# Patient Record
Sex: MALE | Race: WHITE
[De-identification: names, ages, dates, MRNs, and addresses within clinical notes are randomized per-mention and may not be internally consistent; named-entity substitution may affect disease eponyms.]

---

## 2018-01-18 ENCOUNTER — HOSPITAL ENCOUNTER (OUTPATIENT)
Dept: HOSPITAL 92 - LABBT | Age: 78
Discharge: HOME | End: 2018-01-18
Attending: ORTHOPAEDIC SURGERY
Payer: MEDICARE

## 2018-01-18 DIAGNOSIS — M17.11: ICD-10-CM

## 2018-01-18 DIAGNOSIS — Z01.818: Primary | ICD-10-CM

## 2018-01-18 LAB
ANION GAP SERPL CALC-SCNC: 13 MMOL/L (ref 10–20)
APTT PPP: 31.5 SEC (ref 22.9–36.1)
BASOPHILS # BLD AUTO: 0.1 THOU/UL (ref 0–0.2)
BASOPHILS NFR BLD AUTO: 1.4 % (ref 0–1)
BUN SERPL-MCNC: 15 MG/DL (ref 8.4–25.7)
CALCIUM SERPL-MCNC: 9.8 MG/DL (ref 7.8–10.44)
CHLORIDE SERPL-SCNC: 104 MMOL/L (ref 98–107)
CO2 SERPL-SCNC: 28 MMOL/L (ref 23–31)
CREAT CL PREDICTED SERPL C-G-VRATE: 0 ML/MIN (ref 70–130)
CRYSTAL-AUWI FLAG: 0 (ref 0–15)
EOSINOPHIL # BLD AUTO: 0.6 THOU/UL (ref 0–0.7)
EOSINOPHIL NFR BLD AUTO: 9 % (ref 0–10)
GLUCOSE SERPL-MCNC: 94 MG/DL (ref 83–110)
HEV IGM SER QL: 1.3 (ref 0–7.99)
HGB BLD-MCNC: 15.5 G/DL (ref 14–18)
HYALINE CASTS #/AREA URNS LPF: (no result) LPF
INR PPP: 1
LYMPHOCYTES # BLD: 2.1 THOU/UL (ref 1.2–3.4)
LYMPHOCYTES NFR BLD AUTO: 30 % (ref 21–51)
MCH RBC QN AUTO: 34 PG (ref 27–31)
MCV RBC AUTO: 101 FL (ref 80–94)
MONOCYTES # BLD AUTO: 0.8 THOU/UL (ref 0.11–0.59)
MONOCYTES NFR BLD AUTO: 10.9 % (ref 0–10)
NEUTROPHILS # BLD AUTO: 3.4 THOU/UL (ref 1.4–6.5)
NEUTROPHILS NFR BLD AUTO: 48.8 % (ref 42–75)
PATHC CAST-AUWI FLAG: 0.27 (ref 0–2.49)
PLATELET # BLD AUTO: 374 THOU/UL (ref 130–400)
POTASSIUM SERPL-SCNC: 4.8 MMOL/L (ref 3.5–5.1)
PROTHROMBIN TIME: 13.3 SEC (ref 12–14.7)
RBC # BLD AUTO: 4.55 MILL/UL (ref 4.7–6.1)
RBC UR QL AUTO: (no result) HPF (ref 0–3)
SODIUM SERPL-SCNC: 140 MMOL/L (ref 136–145)
SP GR UR STRIP: 1.02 (ref 1–1.04)
SPERM-AUWI FLAG: 0 (ref 0–9.9)
WBC # BLD AUTO: 6.9 THOU/UL (ref 4.8–10.8)
YEAST-AUWI FLAG: 0 (ref 0–25)

## 2018-01-18 PROCEDURE — 93005 ELECTROCARDIOGRAM TRACING: CPT

## 2018-01-18 PROCEDURE — 86900 BLOOD TYPING SEROLOGIC ABO: CPT

## 2018-01-18 PROCEDURE — 81001 URINALYSIS AUTO W/SCOPE: CPT

## 2018-01-18 PROCEDURE — 86901 BLOOD TYPING SEROLOGIC RH(D): CPT

## 2018-01-18 PROCEDURE — 71046 X-RAY EXAM CHEST 2 VIEWS: CPT

## 2018-01-18 PROCEDURE — 86850 RBC ANTIBODY SCREEN: CPT

## 2018-01-18 PROCEDURE — 93010 ELECTROCARDIOGRAM REPORT: CPT

## 2018-01-18 PROCEDURE — 85730 THROMBOPLASTIN TIME PARTIAL: CPT

## 2018-01-18 PROCEDURE — 80048 BASIC METABOLIC PNL TOTAL CA: CPT

## 2018-01-18 PROCEDURE — 85610 PROTHROMBIN TIME: CPT

## 2018-01-18 PROCEDURE — 85025 COMPLETE CBC W/AUTO DIFF WBC: CPT

## 2018-01-18 NOTE — RAD
CHEST 2 VIEWS:

 

HISTORY: 

Preop.

 

COMPARISON: 

Chest 2 views 4/20/16.

 

FINDINGS: 

Incidental note is made of an azygous fissure.  Dense vascular calcifications of transverse aorta.

 

Multiple surgical clips in the mediastinum.  No focal airspace consolidation, pneumothorax, or effusi
on.  Mild pleural thickening in the left lower hemithorax.

 

No acute osseous abnormality.

 

IMPRESSION: 

Chronic changes.  No acute intrathoracic abnormality.

 

POS: [facility]

## 2018-01-23 ENCOUNTER — HOSPITAL ENCOUNTER (OUTPATIENT)
Dept: HOSPITAL 92 - SDC | Age: 78
LOS: 1 days | Discharge: HOME | End: 2018-01-24
Attending: ORTHOPAEDIC SURGERY
Payer: MEDICARE

## 2018-01-23 VITALS — BODY MASS INDEX: 24.1 KG/M2

## 2018-01-23 DIAGNOSIS — Z88.8: ICD-10-CM

## 2018-01-23 DIAGNOSIS — E78.5: ICD-10-CM

## 2018-01-23 DIAGNOSIS — Z85.118: ICD-10-CM

## 2018-01-23 DIAGNOSIS — Z90.2: ICD-10-CM

## 2018-01-23 DIAGNOSIS — Z98.890: ICD-10-CM

## 2018-01-23 DIAGNOSIS — Z85.841: ICD-10-CM

## 2018-01-23 DIAGNOSIS — Z79.899: ICD-10-CM

## 2018-01-23 DIAGNOSIS — Z88.0: ICD-10-CM

## 2018-01-23 DIAGNOSIS — K25.9: ICD-10-CM

## 2018-01-23 DIAGNOSIS — M17.0: Primary | ICD-10-CM

## 2018-01-23 DIAGNOSIS — M21.062: ICD-10-CM

## 2018-01-23 DIAGNOSIS — I10: ICD-10-CM

## 2018-01-23 PROCEDURE — 96361 HYDRATE IV INFUSION ADD-ON: CPT

## 2018-01-23 PROCEDURE — 97116 GAIT TRAINING THERAPY: CPT

## 2018-01-23 PROCEDURE — 36415 COLL VENOUS BLD VENIPUNCTURE: CPT

## 2018-01-23 PROCEDURE — G0378 HOSPITAL OBSERVATION PER HR: HCPCS

## 2018-01-23 PROCEDURE — 97139 UNLISTED THERAPEUTIC PX: CPT

## 2018-01-23 PROCEDURE — 27447 TOTAL KNEE ARTHROPLASTY: CPT

## 2018-01-23 PROCEDURE — 97530 THERAPEUTIC ACTIVITIES: CPT

## 2018-01-23 PROCEDURE — 0SRD0J9 REPLACEMENT OF LEFT KNEE JOINT WITH SYNTHETIC SUBSTITUTE, CEMENTED, OPEN APPROACH: ICD-10-PCS | Performed by: ORTHOPAEDIC SURGERY

## 2018-01-23 PROCEDURE — 8E0YXBZ COMPUTER ASSISTED PROCEDURE OF LOWER EXTREMITY: ICD-10-PCS | Performed by: ORTHOPAEDIC SURGERY

## 2018-01-23 PROCEDURE — C1713 ANCHOR/SCREW BN/BN,TIS/BN: HCPCS

## 2018-01-23 PROCEDURE — 97150 GROUP THERAPEUTIC PROCEDURES: CPT

## 2018-01-23 PROCEDURE — C1776 JOINT DEVICE (IMPLANTABLE): HCPCS

## 2018-01-23 PROCEDURE — 96374 THER/PROPH/DIAG INJ IV PUSH: CPT

## 2018-01-23 PROCEDURE — 85027 COMPLETE CBC AUTOMATED: CPT

## 2018-01-23 PROCEDURE — 20985 CPTR-ASST DIR MS PX: CPT

## 2018-01-23 PROCEDURE — A4306 DRUG DELIVERY SYSTEM <=50 ML: HCPCS

## 2018-01-23 PROCEDURE — 73560 X-RAY EXAM OF KNEE 1 OR 2: CPT

## 2018-01-23 RX ADMIN — HYDROCODONE BITARTRATE AND ACETAMINOPHEN PRN TAB: 10; 325 TABLET ORAL at 15:48

## 2018-01-23 RX ADMIN — HYDROCODONE BITARTRATE AND ACETAMINOPHEN PRN TAB: 10; 325 TABLET ORAL at 20:34

## 2018-01-23 NOTE — PDOC.PN
- Subjective


Encounter Start Date: 01/23/18


Encounter Start Time: 19:45


Subjective: Consult for med mgmt s/p L TKA. Hx of HTN, BPH, DJD and Lung Ca.


-: Reviewed all hx, labs, rads and EKG's. c/o L knee pain currently but


-: no CP, SOB, N/V.





- Objective


MAR Reviewed: Yes


Vital Signs & Weight: 


 Vital Signs (12 hours)











  Temp Pulse Resp BP Pulse Ox


 


 01/23/18 16:00    18  


 


 01/23/18 12:00  97.7 F  74  18  162/87 H  95








 Weight











Weight                         178 lb














Additional Labs: 





 Laboratory Tests











  01/18/18 01/18/18 01/18/18





  10:29 10:29 10:29


 


WBC   6.9 


 


Hgb   15.5 


 


Hct   45.9 


 


MCV   101.0 H 


 


Plt Count   374 


 


Neutrophils %   48.8 


 


PT    13.3


 


INR    1.0


 


APTT    31.5


 


Sodium  140  


 


Potassium  4.8  


 


Chloride  104  


 


Carbon Dioxide  28  


 


Anion Gap  13  


 


BUN  15  


 


Creatinine  0.87  


 


Estimated GFR (MDRD)  86  


 


Glucose  94  


 


Calcium  9.8  











EKG Reviewed by me: Yes (Tele - NSR in 70's)





Phys Exam





- Physical Examination


Constitutional: NAD


HEENT: PERRLA, oral pharynx no lesions


Neck: no JVD, supple


Respiratory: no wheezing, clear to auscultation bilateral


Cardiovascular: RRR


Gastrointestinal: soft, non-tender, no distention, positive bowel sounds


L knee with surgical dressing in place


Musculoskeletal: pulses present, edema present


Neurological: normal sensation, moves all 4 limbs


Psychiatric: A&O x 3


Skin: normal turgor, cap refill <2 seconds





Dx/Plan


(1) HTN (hypertension)


Code(s): I10 - ESSENTIAL (PRIMARY) HYPERTENSION   Status: Chronic   


Qualifiers: 


   Hypertension type: essential hypertension   Qualified Code(s): I10 - 

Essential (primary) hypertension   


Comment: Resume Lasix, Nifedipine and Lisinopril   





(2) HLD (hyperlipidemia)


Code(s): E78.5 - HYPERLIPIDEMIA, UNSPECIFIED   Status: Chronic   Comment: 

Continue Lipitor 10mg HS   





(3) Hx of cancer of lung


Code(s): Z85.118 - PERSONAL HISTORY OF MALIGNANT NEOPLASM OF BRONCHUS AND LUNG 

  Status: Chronic   





(4) Gastric stress ulcer


Code(s): K25.9 - GASTRIC ULCER, UNSP AS ACUTE OR CHRONIC, W/O HEMOR OR PERF   

Status: Resolved   Comment: Pepcid 20mg BID   





(5) Status post total knee replacement, left


Code(s): Z96.652 - PRESENCE OF LEFT ARTIFICIAL KNEE JOINT   Status: Acute   

Comment: s/p L TKA, pain control, DVT ppx, PT for mobilization   





- Plan


continue antibiotics, PT/OT, , incentive spirometry, out of bed/

ambulate, DVT proph w/SCDs


Stable currently


-: Continue home BP regimen and monitor clinically


-: DVT ppx


-: ASA 81mg BID


-: Continue Celebrex 200mg daily





* AM lab: CBC


* Thank you for the consult. Will continue to follow with primary team.

## 2018-01-23 NOTE — RAD
TWO VIEWS OF THE LEFT KNEE:

 

01/23/2018

 

HISTORY:

Status post knee arthroplasty.

 

FINDINGS:

Extensive atherosclerotic calcification is seen within the visualized left lower extremity posteriorl
y.  There is postoperative change involving the posterior aspect of the patella.  Tibial and fibular 
components are present, well seated, with no evidence for hardware failure, acute fracture, or disloc
ation.  Postoperative gas is noted anteriorly.

 

IMPRESSION:

Status post total knee arthroplasty on the left.

 

POS: [facility]

## 2018-01-23 NOTE — OP
DATE OF PROCEDURE:  01/23/2018

 

PREOPERATIVE DIAGNOSIS:  Left knee degenerative joint disease.

 

POSTOPERATIVE DIAGNOSIS:  Left knee degenerative joint disease.

 

SURGEON:  Julio C Zhou M.D.

 

ASSISTANT:  Vick Uriarte PA-C.

 

BLOOD LOSS:  Minimal.

 

SPECIMEN:  None.

 

DRAINS:  None.

 

COMPLICATIONS:  None.

 

FINDINGS AT SURGERY:  Valgus gonarthrosis.

 

IMPLANTS USED:  [place] Triathlon 4 femur, 5 tibia, 11 mm CSX3 polyethylene, and A29.

 

PROCEDURE IN DETAIL:  After informed consent was obtained in the preoperative holding area.  The peter
ent was taken to the operative suite where general anesthesia was induced.  Once adequate level of ge
neral anesthesia was obtained, the patient was positioned and a well-padded tourniquet was placed vj
und the left proximal thigh.  The left lower extremity was then prepped and draped in the usual steri
le fashion.  Prior to exsanguination, a time out was called and all members of the surgical team agre
ed upon site, surgeon, and patient.  The extremity was then exsanguinated and the tourniquet was rais
ed.  A midline longitudinal incision was then made directly over the patella extending two fingerbrea
dths above the superior pole of the patella and two fingerbreadths inferior to the inferior patellar 
pole of the patella.  Deeper subcutaneous layers were dissected sharply and local bleeding was contro
lled with Bovie electrocautery.  A quad tendon longitudinal split was then made sharply and a median 
parapatellar arthrotomy was carried out both sharp and with Bovie electrocautery, carried down to one
 fingerbreadth medial to the tibial tubercle.  The knee was then placed into flexion and the patella 
was everted nicely, and a copious fat pad ectomy was performed allowing for greater exposure of the t
ibia.  The computer-assisted distal femoral fiducial was then placed and pinned firmly, and the dista
l femoral cutting guide was pinned firmly into place.  The oscillating saw was then used to remove th
e appropriate amount of bone.  The 4-in-1 cutting block was then placed on the distal femur and the o
scillating saw was used to remove the appropriate amount of bone off of the anterior, posterior, and 
chamfer cuts.  After completion of bone cuts, the anterior cruciate ligament was resected sharply and
 the posterior cruciate ligament retractor was placed and the tibia was subluxed for better exposure.
  Partial meniscectomies were carried out, and the tibial computer-assisted fiducial was pinned, and 
the cutting guide was placed.  Oscillating saw was then used to remove the bone with Hohmann retracto
rs used to take care and protect the collateral ligaments.  After the tibial resection was performed,
 a laminar  was placed in between the freshened bone cuts.  The knee placed at 90 degrees and
 further bilateral meniscectomies were carried out, and the curved osteotome and curettage was used t
o remove any excess bone spurs in the posterior compartment.  Exparel was then injected into the post
erior capsule, pattie-articular synovia, pre-patella synovia, and musculature surrounding the capsule. 
 The trial femoral component, tibial baseplate were placed with the appropriate polyethylene trial in
sert with an appropriate polyethylene spacer and patellar button.  The knee was taken through full ra
nge of motion with flexion and extension from 0-90 degrees and patellar broach squarely in the trochl
ea without any squinting or subluxation noted.  The knee was also stable to varus and valgus stressin
g at 0, 15, 45, and 90 degrees of flexion.  The drawer was negative. All trial components were then r
emoved and the keel punch was used to provide the appropriate defect in the tibia with a mallet.  The
 freshened bone cuts were copiously irrigated with pulsatile lavage of about 1-1/2 liters to remove a
ll excess debris.  The freshened bone cuts were then dried and with suction and lap sponge.  The knee
 was placed in flexion and retractors were placed to provide access to all bone cuts.  Tobramycin imp
regnated methyl methacrylate cement was then placed on the freshened bone cuts and implants which wer
e malleted firmly into place.  Curettage and [place] elevators were used to remove any excess bone ceme
nt.  The knee was placed into full extension and the patellar button was placed under compression, an
d the cement was allowed to cure.  Once completed, the components were again taken through full range
 of motion and copious irrigation of the knee was carried out with another liter of normal saline.  A
ll components were inspected fully with full range of motion and varus and valgus stressing. There wa
s no laxity noted and full extension was observed clinically.  Primary closure was accomplished with 
#2 interrupted Vicryl stitch of the arthrotomy defect.  This was oversewn with a #2 running Quill bar
bed stitch.  The gravitational platelet system was then injected into the arthrotomy prior to closure
.  The subcutaneous layer was then closed with a running 0 barbed Monocryl stitch and skin closure ac
complished with a running subcuticular 3-0 Monocryl barbed Quill stitch and augmented with cement on 
the skin.  Tourniquet was lowered.  Good spontaneous return of distal pulses was noted clinically and
 a sterile dressing was applied to the incision.  The procedure was terminated without any complicati
ons.  The patient was awakened in the operative suite and the tourniquet was removed, and the patient
 was taken to the recovery room in stable condition.

## 2018-01-24 VITALS — SYSTOLIC BLOOD PRESSURE: 106 MMHG | TEMPERATURE: 97.9 F | DIASTOLIC BLOOD PRESSURE: 67 MMHG

## 2018-01-24 LAB
HGB BLD-MCNC: 12.6 G/DL (ref 14–18)
MCH RBC QN AUTO: 33.6 PG (ref 27–31)
MCV RBC AUTO: 102 FL (ref 80–94)
PLATELET # BLD AUTO: 274 THOU/UL (ref 130–400)
RBC # BLD AUTO: 3.76 MILL/UL (ref 4.7–6.1)
WBC # BLD AUTO: 9.1 THOU/UL (ref 4.8–10.8)

## 2018-01-24 RX ADMIN — HYDROCODONE BITARTRATE AND ACETAMINOPHEN PRN TAB: 10; 325 TABLET ORAL at 00:52

## 2018-01-24 RX ADMIN — HYDROCODONE BITARTRATE AND ACETAMINOPHEN PRN TAB: 10; 325 TABLET ORAL at 09:41

## 2018-01-24 RX ADMIN — HYDROCODONE BITARTRATE AND ACETAMINOPHEN PRN TAB: 10; 325 TABLET ORAL at 05:26

## 2018-01-24 RX ADMIN — HYDROCODONE BITARTRATE AND ACETAMINOPHEN PRN TAB: 10; 325 TABLET ORAL at 13:13

## 2018-03-01 ENCOUNTER — HOSPITAL ENCOUNTER (OUTPATIENT)
Dept: HOSPITAL 92 - MRI | Age: 78
Discharge: HOME | End: 2018-03-01
Attending: ORTHOPAEDIC SURGERY
Payer: MEDICARE

## 2018-03-01 DIAGNOSIS — S76.112A: Primary | ICD-10-CM

## 2018-03-01 DIAGNOSIS — Z96.652: ICD-10-CM

## 2018-03-02 ENCOUNTER — HOSPITAL ENCOUNTER (OUTPATIENT)
Dept: HOSPITAL 92 - SDC | Age: 78
Discharge: HOME | End: 2018-03-02
Attending: ORTHOPAEDIC SURGERY
Payer: MEDICARE

## 2018-03-02 VITALS — BODY MASS INDEX: 24.8 KG/M2

## 2018-03-02 DIAGNOSIS — S76.112A: Primary | ICD-10-CM

## 2018-03-02 DIAGNOSIS — Z88.6: ICD-10-CM

## 2018-03-02 DIAGNOSIS — Z88.0: ICD-10-CM

## 2018-03-02 PROCEDURE — 96374 THER/PROPH/DIAG INJ IV PUSH: CPT

## 2018-03-02 PROCEDURE — 0LQM0ZZ REPAIR LEFT UPPER LEG TENDON, OPEN APPROACH: ICD-10-PCS | Performed by: ORTHOPAEDIC SURGERY

## 2018-03-02 PROCEDURE — 27385 REPAIR OF THIGH MUSCLE: CPT

## 2018-03-02 PROCEDURE — 97116 GAIT TRAINING THERAPY: CPT

## 2018-03-02 PROCEDURE — 97139 UNLISTED THERAPEUTIC PX: CPT

## 2018-03-02 NOTE — OP
DATE OF PROCEDURE:  03/02/2018

 

TYPE OF PROCEDURE:  Left open quadriceps tendon repair.  Manipulation under anesthesia.

 

PREOPERATIVE DIAGNOSIS:  Quadriceps tendon rupture.

 

POSTOPERATIVE DIAGNOSIS:  Quadriceps tendon rupture.

 

SURGEON:  Julio C Zhou M.D.

 

ASSISTANT:  Vick Uriarte PA-C.

 

BLOOD LOSS:  Minimal.

 

SPECIMENS:  None.

 

DRAINS:  None.

 

COMPLICATIONS:  None.

 

DESCRIPTION OF PROCEDURE:  Left leg was prepped and draped in the usual sterile fashion.  The patient
 received vancomycin and Levaquin preoperatively.  I opened up the old incision.  Large hematoma was 
encountered and dissected free the quadriceps tendon proximally and the patella distally.  He had rup
tured his quadriceps tendon transversely and ruptured extended down the lateral aspect of the patella
.  The medial aspect of the patella repair had held in just fine and the quadriceps tendon repair hel
d just fine.  This was a true transverse rupture.  This is a somewhat atypical rupture pattern in a p
ost total knee.  Irrigation was performed pulsatile lavage.  I repaired the tendon with #5 Vicryl in 
multiple layers.  I then reinforced this with 2-0 Vicryl for some of the thinner aspects.  I was able
 to flex the knee 90 degrees and the tendon repair held.  The patient was placed in extension.  Subcu
taneous tissue closed with 0 Quill and skin was closed with 2-0 Prolene.  Sterile dressings applied. 
 Tourniquet was released.  The patient be placed in postoperative straight leg knee immobilizer for 2
 months.

## 2018-09-26 NOTE — EKG
Test Reason : 

Blood Pressure : ***/*** mmHG

Vent. Rate : 075 BPM     Atrial Rate : 075 BPM

   P-R Int : 154 ms          QRS Dur : 094 ms

    QT Int : 406 ms       P-R-T Axes : -26 061 043 degrees

   QTc Int : 453 ms

 

Normal sinus rhythm

Normal ECG

When compared with ECG of 14-DEC-2010 06:56,

Premature ventricular complexes are no longer Present

Confirmed by KELSEY HENNESSY, DR. KELLER ([id number]) on 1/18/2018 7:55:51 PM

 

Referred By:  RADHA           Confirmed By:DR. KRISHNA COLE MD Statement Selected

## 2018-10-04 ENCOUNTER — HOSPITAL ENCOUNTER (OUTPATIENT)
Dept: HOSPITAL 92 - LABBT | Age: 78
Discharge: HOME | End: 2018-10-04
Attending: ORTHOPAEDIC SURGERY
Payer: MEDICARE

## 2018-10-04 ENCOUNTER — HOSPITAL ENCOUNTER (INPATIENT)
Dept: HOSPITAL 92 - SJJU | Age: 78
LOS: 15 days | Discharge: HOME | DRG: 470 | End: 2018-10-19
Attending: ORTHOPAEDIC SURGERY | Admitting: ORTHOPAEDIC SURGERY
Payer: MEDICARE

## 2018-10-04 VITALS — BODY MASS INDEX: 26.4 KG/M2

## 2018-10-04 DIAGNOSIS — G40.909: ICD-10-CM

## 2018-10-04 DIAGNOSIS — Z88.0: ICD-10-CM

## 2018-10-04 DIAGNOSIS — J44.9: ICD-10-CM

## 2018-10-04 DIAGNOSIS — N40.0: ICD-10-CM

## 2018-10-04 DIAGNOSIS — Z01.818: Primary | ICD-10-CM

## 2018-10-04 DIAGNOSIS — Z79.899: ICD-10-CM

## 2018-10-04 DIAGNOSIS — M17.11: Primary | ICD-10-CM

## 2018-10-04 DIAGNOSIS — Z96.652: ICD-10-CM

## 2018-10-04 DIAGNOSIS — Z88.6: ICD-10-CM

## 2018-10-04 DIAGNOSIS — N18.2: ICD-10-CM

## 2018-10-04 DIAGNOSIS — F41.9: ICD-10-CM

## 2018-10-04 DIAGNOSIS — E78.5: ICD-10-CM

## 2018-10-04 DIAGNOSIS — Z86.011: ICD-10-CM

## 2018-10-04 DIAGNOSIS — M17.11: ICD-10-CM

## 2018-10-04 DIAGNOSIS — F51.04: ICD-10-CM

## 2018-10-04 DIAGNOSIS — F32.9: ICD-10-CM

## 2018-10-04 DIAGNOSIS — Z85.118: ICD-10-CM

## 2018-10-04 DIAGNOSIS — I12.9: ICD-10-CM

## 2018-10-04 LAB
CRYSTAL-AUWI FLAG: 0 (ref 0–15)
HEV IGM SER QL: 1.3 (ref 0–7.99)
HYALINE CASTS #/AREA URNS LPF: (no result) LPF
PATHC CAST-AUWI FLAG: 0 (ref 0–2.49)
RBC UR QL AUTO: (no result) HPF (ref 0–3)
SP GR UR STRIP: 1.02 (ref 1–1.04)
SPERM-AUWI FLAG: 0 (ref 0–9.9)
YEAST-AUWI FLAG: 0 (ref 0–25)

## 2018-10-04 PROCEDURE — 85027 COMPLETE CBC AUTOMATED: CPT

## 2018-10-04 PROCEDURE — 87081 CULTURE SCREEN ONLY: CPT

## 2018-10-04 PROCEDURE — 81001 URINALYSIS AUTO W/SCOPE: CPT

## 2018-10-04 PROCEDURE — 93005 ELECTROCARDIOGRAM TRACING: CPT

## 2018-10-04 PROCEDURE — 36415 COLL VENOUS BLD VENIPUNCTURE: CPT

## 2018-10-04 PROCEDURE — 80048 BASIC METABOLIC PNL TOTAL CA: CPT

## 2018-10-04 PROCEDURE — C1713 ANCHOR/SCREW BN/BN,TIS/BN: HCPCS

## 2018-10-04 PROCEDURE — 71046 X-RAY EXAM CHEST 2 VIEWS: CPT

## 2018-10-04 PROCEDURE — 83735 ASSAY OF MAGNESIUM: CPT

## 2018-10-04 PROCEDURE — C1776 JOINT DEVICE (IMPLANTABLE): HCPCS

## 2018-10-04 PROCEDURE — 93010 ELECTROCARDIOGRAM REPORT: CPT

## 2018-10-04 NOTE — RAD
PA AND LATERAL VIEWS OF THE CHEST:

 

HISTORY: 

Preoperative evaluation.

 

FINDINGS: 

Comparison is made with the exam of 1/18/2018.

 

The azygous fissure is again seen.  The heart size is normal.  The aorta is tortuous.  The lungs are 
expanded without lobar consolidation, pneumothoraces, or pleural effusions.  Old left-sided rib fract
ures are redemonstrated.  Mild pleural thickening in the left lower hemithorax is again seen.  No acu
te osseous abnormalities identified.

 

IMPRESSION: 

Stable exam.  No acute process.

 

POS: [facility]

## 2018-10-07 NOTE — EKG
Test Reason : 

Blood Pressure : ***/*** mmHG

Vent. Rate : 065 BPM     Atrial Rate : 065 BPM

   P-R Int : 154 ms          QRS Dur : 094 ms

    QT Int : 458 ms       P-R-T Axes : -09 050 045 degrees

   QTc Int : 476 ms

 

*** Poor data quality, interpretation may be adversely affected

Normal sinus rhythm with sinus arrhythmia

Normal ECG

When compared with ECG of 18-JAN-2018 10:21,

No significant change was found

Confirmed by RAE DANGELO ([id number]) on 10/7/2018 8:47:06 PM

 

Referred By:  RADHA           Confirmed By:RAE DANGELO

## 2018-10-10 ENCOUNTER — HOSPITAL ENCOUNTER (OUTPATIENT)
Dept: HOSPITAL 92 - LABBT | Age: 78
Discharge: HOME | End: 2018-10-10
Attending: ORTHOPAEDIC SURGERY
Payer: MEDICARE

## 2018-10-10 DIAGNOSIS — M17.11: ICD-10-CM

## 2018-10-10 DIAGNOSIS — Z01.818: Primary | ICD-10-CM

## 2018-10-10 LAB
ANION GAP SERPL CALC-SCNC: 12 MMOL/L (ref 10–20)
BASOPHILS # BLD AUTO: 0.1 THOU/UL (ref 0–0.2)
BASOPHILS NFR BLD AUTO: 1.1 % (ref 0–1)
BUN SERPL-MCNC: 14 MG/DL (ref 8.4–25.7)
CALCIUM SERPL-MCNC: 9.4 MG/DL (ref 7.8–10.44)
CHLORIDE SERPL-SCNC: 106 MMOL/L (ref 98–107)
CO2 SERPL-SCNC: 25 MMOL/L (ref 23–31)
CREAT CL PREDICTED SERPL C-G-VRATE: 0 ML/MIN (ref 70–130)
EOSINOPHIL # BLD AUTO: 0.6 THOU/UL (ref 0–0.7)
EOSINOPHIL NFR BLD AUTO: 10.1 % (ref 0–10)
GLUCOSE SERPL-MCNC: 95 MG/DL (ref 83–110)
HGB BLD-MCNC: 14.7 G/DL (ref 14–18)
INR PPP: 1
LYMPHOCYTES # BLD: 2.1 THOU/UL (ref 1.2–3.4)
LYMPHOCYTES NFR BLD AUTO: 34.2 % (ref 21–51)
MCH RBC QN AUTO: 34 PG (ref 27–31)
MCV RBC AUTO: 101 FL (ref 78–98)
MONOCYTES # BLD AUTO: 0.7 THOU/UL (ref 0.11–0.59)
MONOCYTES NFR BLD AUTO: 11.2 % (ref 0–10)
NEUTROPHILS # BLD AUTO: 2.6 THOU/UL (ref 1.4–6.5)
NEUTROPHILS NFR BLD AUTO: 43.4 % (ref 42–75)
PLATELET # BLD AUTO: 282 THOU/UL (ref 130–400)
POTASSIUM SERPL-SCNC: 3.8 MMOL/L (ref 3.5–5.1)
PROTHROMBIN TIME: 13.1 SEC (ref 12–14.7)
RBC # BLD AUTO: 4.32 MILL/UL (ref 4.7–6.1)
SODIUM SERPL-SCNC: 139 MMOL/L (ref 136–145)
WBC # BLD AUTO: 6 THOU/UL (ref 4.8–10.8)

## 2018-10-10 PROCEDURE — 85610 PROTHROMBIN TIME: CPT

## 2018-10-10 PROCEDURE — 86850 RBC ANTIBODY SCREEN: CPT

## 2018-10-10 PROCEDURE — 85025 COMPLETE CBC W/AUTO DIFF WBC: CPT

## 2018-10-10 PROCEDURE — 86900 BLOOD TYPING SEROLOGIC ABO: CPT

## 2018-10-10 PROCEDURE — 86901 BLOOD TYPING SEROLOGIC RH(D): CPT

## 2018-10-10 PROCEDURE — 80048 BASIC METABOLIC PNL TOTAL CA: CPT

## 2018-10-16 PROCEDURE — 0SRC0J9 REPLACEMENT OF RIGHT KNEE JOINT WITH SYNTHETIC SUBSTITUTE, CEMENTED, OPEN APPROACH: ICD-10-PCS | Performed by: ORTHOPAEDIC SURGERY

## 2018-10-16 RX ADMIN — HYDROCODONE BITARTRATE AND ACETAMINOPHEN PRN TAB: 10; 325 TABLET ORAL at 21:03

## 2018-10-16 RX ADMIN — NIFEDIPINE SCH MG: 30 TABLET, FILM COATED, EXTENDED RELEASE ORAL at 21:04

## 2018-10-16 RX ADMIN — ASPIRIN SCH MG: 81 TABLET ORAL at 21:05

## 2018-10-16 NOTE — PDOC.PN
- Subjective


Encounter Start Date: 10/16/18


Encounter Start Time: 11:45





Patient seen and examined for med mngt. No CP/SOB/N/V/Abd pain. Follows Dr Ortega. Echo - 12/17 - normal EF. No new complaints. Pain controlled.





- Objective


MAR Reviewed: Yes


Vital Signs & Weight: 


 Vital Signs (12 hours)











  Temp Pulse Resp BP Pulse Ox


 


 10/16/18 09:30  97.9 F  83  18  108/66  94 L








 Weight











Weight                         195 lb














Additional Labs: 





 Laboratory Tests











  10/10/18 10/10/18





  11:05 11:20


 


Hgb  14.7 


 


Hct  43.5 


 


Sodium   139


 


Potassium   3.8











EKG Reviewed by me: Yes (SR)





Phys Exam





- Physical Examination


Constitutional: NAD


Respiratory: no wheezing, no rales, no rhonchi


Cardiovascular: RRR, no rub


Gastrointestinal: soft, non-tender, positive bowel sounds


Musculoskeletal: no edema


Neurological: moves all 4 limbs





Dx/Plan





- Plan


DVT proph w/SCDs





IMPRESSION/PLAN:


1. COPD 


   Will cont inhaled steroids with PRN nebs





2. HTN


   Cont Lasix/Lisinopril at home dose


   Change Procardia XL to 30 mg BID





3. Seizure disorder


   Cont Keppra





4. h/o Lung Ca s/p resection





5. Anxiety/Depression


   Cont Zoloft, No suicidal ideation





6.HLD


   Cont Zocor





7. Chronic insomnia


   Cont Ambien PRN





8. BPH


   Cont Flomax





Thank you for this consultation. Will follow.


Full code. DPOA - self/family








Review of Systems





- Review of Systems


Respiratory: negative: Cough, Dry, Shortness of Breath, Hemoptysis, SOB with 

Excertion, Pleuritic Pain, Sputum, Wheezing


Cardiovascular: negative: chest pain, palpitations, orthopnea, paroxysmal 

nocturnal dyspnea, edema, light headedness, other


Gastrointestinal: negative: Nausea, Vomiting, Abdominal Pain, Diarrhea, 

Constipation, Melena, Hematochezia, Other





- Medications/Allergies


Allergies/Adverse Reactions: 


 Allergies











Allergy/AdvReac Type Severity Reaction Status Date / Time


 


Penicillins Allergy  Rash Verified 10/04/18 09:13


 


ibuprofen AdvReac  BLEEDING Verified 10/04/18 09:13





   ULCERS  











Medications: 


 Current Medications





Acetaminophen (Tylenol)  650 mg PO Q4H PRN


   PRN Reason: HA/ T > 101F; Mild Pain (1-3)


Hydrocodone Bitart/Acetaminophen (Norco 10/325)  1 tab PO Q4H PRN


   PRN Reason: Pain (1-3)


Hydrocodone Bitart/Acetaminophen (Norco 10/325)  2 tab PO Q4H PRN


   PRN Reason: PAIN (4-6)


Aspirin (Ecotrin)  81 mg PO BID [facility]


Atorvastatin Calcium (Lipitor)  10 mg PO HS [facility]


Celecoxib (Celebrex)  200 mg PO QAM [facility]


Fentanyl (Sublimaze)  50 mcg IV Q1H PRN


   PRN Reason: BREAKTHRU PAIN


Ferrous Gluconate (Fergon)  324 mg PO BID [facility]


Furosemide (Lasix)  20 mg PO .3 X WEEK [facility]


Vancomycin HCl 1.5 gm/ Sodium (Chloride)  300 mls @ 200 mls/hr IVPB ONCALL-OR 

[facility]


   Stop: 10/16/18 12:00


Levofloxacin 500 mg/ Device  100 mls @ 100 mls/hr IVPB 0600 [facility]


   Stop: 10/17/18 06:59


Sodium Chloride (Normal Saline 0.9%)  1,000 mls @ 100 mls/hr IV .Q10H [facility]


Vancomycin HCl 1.5 gm/ Sodium (Chloride)  300 mls @ 200 mls/hr IVPB 1800 [facility]


   Stop: 10/16/18 19:29


Ropivacaine 250 ml/ Device  250 mls @ 10 mls/hr NERVE BLCK INF [facility]


Iron/Minerals/Multivitamins (Theragran M)  1 tab PO DAILY [facility]


Ketorolac Tromethamine (Toradol)  15 mg IVP Q6H PRN


   PRN Reason: Moderate Pain (4-6)


   Stop: 10/19/18 07:30


Levetiracetam (Keppra)  500 mg PO BID [facility]


Lisinopril (Zestril)  20 mg PO QAM [facility]


Lorazepam (Ativan)  1 mg PO Q4H PRN


   PRN Reason: Anxiety/Agitation


Nifedipine (Procardia Xl)  60 mg PO HS [facility]


Ondansetron HCl (Zofran)  4 mg IVP Q6H PRN


   PRN Reason: Nausea/Vomiting


Potassium Chloride (K-Dur)  20 meq PO .3 X WEEK [facility]


Promethazine HCl (Phenergan)  12.5 mg IM Q4H PRN


   PRN Reason: Nausea


Senna/Docusate Sodium (Senokot S)  2 tab PO BID [facility]


Sertraline HCl (Zoloft)  100 mg PO QAM [facility]


Sodium Chloride (Flush - Normal Saline)  10 ml IVF PRN PRN


   PRN Reason: Saline Flush


Tamsulosin HCl (Flomax)  0.4 mg PO HS [facility]


Tramadol HCl (Ultram)  50 mg PO Q6H PRN


   PRN Reason: Mild Pain (1-3)


Tramadol HCl (Ultram)  100 mg PO Q6H PRN


   PRN Reason: Moderate Pain 4-6


Zolpidem Tartrate (Ambien)  5 mg PO HSPRN PRN


   PRN Reason: Insomnia

## 2018-10-16 NOTE — OP
DATE OF PROCEDURE:  10/16/2018

 

PREOPERATIVE DIAGNOSIS:  Degenerative joint disease, right knee.

 

POSTOPERATIVE DIAGNOSIS:  Degenerative joint disease, right knee.

 

SURGEON:  Julio C Zhou M.D.

 

ASSISTANT:  Vick Uriarte PA-C.

 

BLOOD LOSS:  Minimal.

 

SPECIMEN:  None.

 

DRAINS:  None.

 

COMPLICATIONS:  None.

 

IMPLANTS USED:  [place] Triathlon 4 femur, 5 tibia, 11 mm 6/3 open polyethylene liner by 11 thick and
 A29 patella.

PROCEDURE IN DETAIL:  After informed consent was obtained in the preoperative holding area.  The peter
ent was taken to the operative suite where general anesthesia was induced.  Once adequate level of ge
neral anesthesia was obtained, the patient was positioned and a well-padded tourniquet was placed vj
und the right proximal thigh.  The right lower extremity was then prepped and draped in the usual deborah
rile fashion.  Prior to exsanguination, a time out was called and all members of the surgical team ag
justin upon site, surgeon, and patient.  The extremity was then exsanguinated and the tourniquet was ra
ised.  A midline longitudinal incision was then made directly over the patella extending two fingerbr
eadths above the superior pole of the patella and two fingerbreadths inferior to the inferior patella
r pole of the patella.  Deeper subcutaneous layers were dissected sharply and local bleeding was cont
rolled with Bovie electrocautery.  A quad tendon longitudinal split was then made sharply and a media
n parapatellar arthrotomy was carried out both sharp and with Bovie electrocautery, carried down to o
ne fingerbreadth medial to the tibial tubercle.  The knee was then placed into flexion and the patell
a was everted nicely, and a copious fat pad ectomy was performed allowing for greater exposure of the
 tibia.  The computer-assisted distal femoral fiducial was then placed and pinned firmly, and the dis
[place] femoral cutting guide was pinned firmly into place.  The oscillating saw was then used to remove 
the appropriate amount of bone.  The 4-in-1 cutting block was then placed on the distal femur and the
 oscillating saw was used to remove the appropriate amount of bone off of the anterior, posterior, an
d chamfer cuts.  After completion of bone cuts, the anterior cruciate ligament was resected sharply a
nd the posterior cruciate ligament retractor was placed and the tibia was subluxed for better exposur
e.  Partial meniscectomies were carried out, and the tibial computer-assisted fiducial was pinned, an
d the cutting guide was placed.  Oscillating saw was then used to remove the bone with Hohmann retrac
tors used to take care and protect the collateral ligaments.  After the tibial resection was performe
d, a laminar  was placed in between the freshened bone cuts.  The knee placed at 90 degrees a
nd further bilateral meniscectomies were carried out, and the curved osteotome and curettage was used
 to remove any excess bone spurs in the posterior compartment.  The trial femoral component, tibial b
aseplate were placed with the appropriate polyethylene trial insert with an appropriate polyethylene 
spacer and patellar button.  The knee was taken through full range of motion with flexion and extensi
on from 0-90 degrees and patellar broach squarely in the trochlea without any squinting or subluxatio
n noted.  The knee was also stable to varus and valgus stressing at 0, 15, 45, and 90 degrees of flex
ion.  The drawer was negative. All trial components were then removed and the keel punch was used to 
provide the appropriate defect in the tibia with a mallet.  The freshened bone cuts were copiously ir
rigated with pulsatile lavage of about 1-1/2 liters to remove all excess debris.  The freshened bone 
cuts were then dried and with suction and lap sponge.  The knee was placed in flexion and retractors 
were placed to provide access to all bone cuts.  Tobramycin impregnated methyl methacrylate cement wa
s then placed on the freshened bone cuts and implants which were malleted firmly into place.  Curetta
ge and [place] elevators were used to remove any excess bone cement.  The knee was placed into full ext
ension and the patellar button was placed under compression, and the cement was allowed to cure.  Onc
e completed, the components were again taken through full range of motion and copious irrigation of t
he knee was carried out with another liter of normal saline.  All components were inspected fully wit
h full range of motion and varus and valgus stressing. There was no laxity noted and full extension w
as observed clinically.  Primary closure was accomplished with #2 interrupted Vicryl stitch of the ar
throtomy defect.  This was oversewn with a #2 running Quill barbed stitch.  The gravitational platele
t system was then injected into the arthrotomy prior to closure.  The subcutaneous layer was then ania
sed with a running 0 barbed Monocryl stitch and skin closure accomplished with a running subcuticular
 3-0 Monocryl barbed Quill stitch and augmented with cement on the skin.  Tourniquet was lowered.  Go
od spontaneous return of distal pulses was noted clinically and a sterile dressing was applied to the
 incision.  The procedure was terminated without any complications.  The patient was awakened in the 
operative suite and the ?? was removed, and the patient was taken to the recovery room in stable cond
ition.

## 2018-10-16 NOTE — RAD
RIGHT KNEE TWO VIEWS:

 

History: 

77-year-old male with history of post op total knee arthroplasty. 

 

FINDINGS: 

Recent total knee arthroplasty changes are noted. No dislocation or periprosthetic fracture. Prominen
t vascular calcifications. 

 

IMPRESSION: 

Recent total knee arthroplasty. 

 

POS: [facility]

## 2018-10-17 LAB
ANION GAP SERPL CALC-SCNC: 10 MMOL/L (ref 10–20)
BUN SERPL-MCNC: 12 MG/DL (ref 8.4–25.7)
CALCIUM SERPL-MCNC: 8.7 MG/DL (ref 7.8–10.44)
CHLORIDE SERPL-SCNC: 108 MMOL/L (ref 98–107)
CO2 SERPL-SCNC: 24 MMOL/L (ref 23–31)
CREAT CL PREDICTED SERPL C-G-VRATE: 95 ML/MIN (ref 70–130)
GLUCOSE SERPL-MCNC: 94 MG/DL (ref 83–110)
HGB BLD-MCNC: 12.5 G/DL (ref 14–18)
MAGNESIUM SERPL-MCNC: 2.1 MG/DL (ref 1.6–2.6)
MCH RBC QN AUTO: 33.4 PG (ref 27–31)
MCV RBC AUTO: 101 FL (ref 78–98)
PLATELET # BLD AUTO: 234 THOU/UL (ref 130–400)
POTASSIUM SERPL-SCNC: 3.6 MMOL/L (ref 3.5–5.1)
RBC # BLD AUTO: 3.74 MILL/UL (ref 4.7–6.1)
SODIUM SERPL-SCNC: 138 MMOL/L (ref 136–145)
WBC # BLD AUTO: 7.2 THOU/UL (ref 4.8–10.8)

## 2018-10-17 RX ADMIN — DOCUSATE SODIUM 50 MG AND SENNOSIDES 8.6 MG SCH TAB: 8.6; 5 TABLET, FILM COATED ORAL at 08:11

## 2018-10-17 RX ADMIN — NIFEDIPINE SCH MG: 30 TABLET, FILM COATED, EXTENDED RELEASE ORAL at 21:03

## 2018-10-17 RX ADMIN — DOCUSATE SODIUM 50 MG AND SENNOSIDES 8.6 MG SCH: 8.6; 5 TABLET, FILM COATED ORAL at 21:05

## 2018-10-17 RX ADMIN — ASPIRIN SCH MG: 81 TABLET ORAL at 08:10

## 2018-10-17 RX ADMIN — NIFEDIPINE SCH MG: 30 TABLET, FILM COATED, EXTENDED RELEASE ORAL at 08:12

## 2018-10-17 RX ADMIN — ASPIRIN SCH MG: 81 TABLET ORAL at 21:03

## 2018-10-17 RX ADMIN — MULTIPLE VITAMINS W/ MINERALS TAB SCH TAB: TAB at 08:10

## 2018-10-17 RX ADMIN — HYDROCODONE BITARTRATE AND ACETAMINOPHEN PRN TAB: 10; 325 TABLET ORAL at 21:01

## 2018-10-17 NOTE — PDOC.PN
- Subjective


Encounter Start Date: 10/17/18


Encounter Start Time: 14:00





Patient seen and examined for med mngt. Had nausea this AM - now improved. No 

other complaints. No overnight events





- Objective


MAR Reviewed: Yes


Vital Signs & Weight: 


 Vital Signs (12 hours)











  Temp Pulse Resp BP BP BP Pulse Ox


 


 10/17/18 21:03   78   160/69 H   


 


 10/17/18 20:00  98.5 F  78  16   135/71   95


 


 10/17/18 15:21  98.7 F  80  16    136/73  93 L


 


 10/17/18 12:05      106/67  


 


 10/17/18 12:00  98.1 F  81  18    96/61  93 L








 Weight











Admit Weight                   195 lb


 


Weight                         195 lb














I&O: 


 











 10/16/18 10/17/18 10/18/18





 06:59 06:59 06:59


 


Intake Total  2740 1140


 


Output Total  1625 


 


Balance  1115 1140











Result Diagrams: 


 10/17/18 04:40





 10/17/18 04:40





Phys Exam





- Physical Examination


Constitutional: NAD


Respiratory: no wheezing, no rhonchi


Cardiovascular: RRR, no rub


Gastrointestinal: soft, non-tender, positive bowel sounds


Musculoskeletal: no edema


Neurological: moves all 4 limbs





Dx/Plan





- Plan


DVT proph w/SCDs





IMPRESSION:


1. COPD 


2. HTN


3. Seizure disorder


4. h/o Lung Ca s/p resection


5. Anxiety/Depression


6.HLD


7. Chronic insomnia


8. BPH


9. CKD 2





PLAN:


Will cont inhaled steroids with PRN nebs


Cont Lasix/Lisinopril/Procardia XL


Cont other meds as below








Review of Systems





- Review of Systems


Respiratory: negative: Cough, Dry, Shortness of Breath, Hemoptysis, SOB with 

Excertion, Pleuritic Pain, Sputum, Wheezing


Cardiovascular: negative: chest pain, palpitations, orthopnea, paroxysmal 

nocturnal dyspnea, edema, light headedness, other


Gastrointestinal: Nausea.  negative: Vomiting, Abdominal Pain, Diarrhea, 

Constipation, Melena, Hematochezia, Other





- Medications/Allergies


Allergies/Adverse Reactions: 


 Allergies











Allergy/AdvReac Type Severity Reaction Status Date / Time


 


Penicillins Allergy  Rash Verified 10/04/18 09:13


 


ibuprofen AdvReac  BLEEDING Verified 10/04/18 09:13





   ULCERS  











Medications: 


 Current Medications





Acetaminophen (Tylenol)  650 mg PO Q4H PRN


   PRN Reason: HA/ T > 101F; Mild Pain (1-3)


Hydrocodone Bitart/Acetaminophen (Norco 10/325)  1 tab PO Q4H PRN


   PRN Reason: Pain (1-3)


   Last Admin: 10/17/18 04:33 Dose:  1 tab


Hydrocodone Bitart/Acetaminophen (Norco 10/325)  2 tab PO Q4H PRN


   PRN Reason: PAIN (4-6)


   Last Admin: 10/17/18 21:01 Dose:  2 tab


Albuterol/Ipratropium (Duoneb)  3 ml NEB [id number] PRN


   PRN Reason: SOB &/or Wheezing


Aspirin (Ecotrin)  81 mg PO BID [facility]


   Last Admin: 10/17/18 21:03 Dose:  81 mg


Atorvastatin Calcium (Lipitor)  10 mg PO HS [facility]


   Last Admin: 10/17/18 21:02 Dose:  10 mg


Celecoxib (Celebrex)  200 mg PO QAM [facility]


   Last Admin: 10/17/18 08:10 Dose:  200 mg


Fentanyl (Sublimaze)  50 mcg IV Q1H PRN


   PRN Reason: BREAKTHRU PAIN


Ferrous Gluconate (Fergon)  324 mg PO BID [facility]


   Last Admin: 10/17/18 21:03 Dose:  324 mg


Furosemide (Lasix)  20 mg PO .3 X WEEK [facility]


Sodium Chloride (Normal Saline 0.9%)  1,000 mls @ 100 mls/hr IV .Q10H [facility]


   Last Admin: 10/17/18 21:54 Dose:  Not Given


Ropivacaine 250 ml/ Device  250 mls @ 10 mls/hr NERVE BLCK INF [facility]


   Last Admin: 10/17/18 08:46 Dose:  250 mls


Iron/Minerals/Multivitamins (Theragran M)  1 tab PO DAILY [facility]


   Last Admin: 10/17/18 08:10 Dose:  1 tab


Ketorolac Tromethamine (Toradol)  15 mg IVP Q6H PRN


   PRN Reason: Moderate Pain (4-6)


   Stop: 10/19/18 07:30


   Last Admin: 10/17/18 06:46 Dose:  15 mg


Levetiracetam (Keppra)  500 mg PO BID [facility]


   Last Admin: 10/17/18 21:02 Dose:  500 mg


Lisinopril (Zestril)  20 mg PO QAM [facility]


   Last Admin: 10/17/18 08:11 Dose:  20 mg


Lorazepam (Ativan)  1 mg PO Q4H PRN


   PRN Reason: Anxiety/Agitation


Nifedipine (Procardia Xl)  30 mg PO BID [facility]


   Last Admin: 10/17/18 21:03 Dose:  30 mg


Ondansetron HCl (Zofran)  4 mg IVP Q6H PRN


   PRN Reason: Nausea/Vomiting


   Last Admin: 10/17/18 11:51 Dose:  4 mg


Polyethylene Glycol (Miralax)  17 gm PO DAILY PRN


   PRN Reason: Constipation


Potassium Chloride (K-Dur)  20 meq PO .3 X WEEK [facility]


Promethazine HCl (Phenergan)  12.5 mg IM Q4H PRN


   PRN Reason: Nausea


Senna/Docusate Sodium (Senokot S)  2 tab PO BID [facility]


   Last Admin: 10/17/18 21:05 Dose:  Not Given


Sertraline HCl (Zoloft)  100 mg PO QAM [facility]


   Last Admin: 10/17/18 08:19 Dose:  100 mg


Sodium Chloride (Flush - Normal Saline)  10 ml IVF PRN PRN


   PRN Reason: Saline Flush


Tamsulosin HCl (Flomax)  0.4 mg PO HS [facility]


   Last Admin: 10/17/18 21:02 Dose:  0.4 mg


Tramadol HCl (Ultram)  50 mg PO Q6H PRN


   PRN Reason: Mild Pain (1-3)


Tramadol HCl (Ultram)  100 mg PO Q6H PRN


   PRN Reason: Moderate Pain 4-6


   Last Admin: 10/17/18 15:37 Dose:  100 mg


Zolpidem Tartrate (Ambien)  5 mg PO HSPRN PRN


   PRN Reason: Insomnia


   Last Admin: 10/17/18 21:01 Dose:  5 mg

## 2018-10-18 LAB
HGB BLD-MCNC: 11.6 G/DL (ref 14–18)
MCH RBC QN AUTO: 33.4 PG (ref 27–31)
MCV RBC AUTO: 102 FL (ref 78–98)
PLATELET # BLD AUTO: 214 THOU/UL (ref 130–400)
RBC # BLD AUTO: 3.48 MILL/UL (ref 4.7–6.1)
WBC # BLD AUTO: 9.5 THOU/UL (ref 4.8–10.8)

## 2018-10-18 RX ADMIN — MULTIPLE VITAMINS W/ MINERALS TAB SCH TAB: TAB at 07:40

## 2018-10-18 RX ADMIN — DOCUSATE SODIUM 50 MG AND SENNOSIDES 8.6 MG SCH: 8.6; 5 TABLET, FILM COATED ORAL at 21:20

## 2018-10-18 RX ADMIN — NIFEDIPINE SCH MG: 30 TABLET, FILM COATED, EXTENDED RELEASE ORAL at 21:19

## 2018-10-18 RX ADMIN — DOCUSATE SODIUM 50 MG AND SENNOSIDES 8.6 MG SCH: 8.6; 5 TABLET, FILM COATED ORAL at 07:41

## 2018-10-18 RX ADMIN — ASPIRIN SCH MG: 81 TABLET ORAL at 21:20

## 2018-10-18 RX ADMIN — HYDROCODONE BITARTRATE AND ACETAMINOPHEN PRN TAB: 10; 325 TABLET ORAL at 05:09

## 2018-10-18 RX ADMIN — NIFEDIPINE SCH: 30 TABLET, FILM COATED, EXTENDED RELEASE ORAL at 07:41

## 2018-10-18 RX ADMIN — ASPIRIN SCH MG: 81 TABLET ORAL at 07:40

## 2018-10-18 RX ADMIN — HYDROCODONE BITARTRATE AND ACETAMINOPHEN PRN TAB: 10; 325 TABLET ORAL at 21:18

## 2018-10-19 VITALS — TEMPERATURE: 98.3 F

## 2018-10-19 VITALS — DIASTOLIC BLOOD PRESSURE: 64 MMHG | SYSTOLIC BLOOD PRESSURE: 102 MMHG

## 2018-10-19 RX ADMIN — ASPIRIN SCH MG: 81 TABLET ORAL at 08:26

## 2018-10-19 RX ADMIN — MULTIPLE VITAMINS W/ MINERALS TAB SCH TAB: TAB at 08:27

## 2018-10-19 RX ADMIN — HYDROCODONE BITARTRATE AND ACETAMINOPHEN PRN TAB: 10; 325 TABLET ORAL at 05:02

## 2018-10-19 RX ADMIN — DOCUSATE SODIUM 50 MG AND SENNOSIDES 8.6 MG SCH: 8.6; 5 TABLET, FILM COATED ORAL at 08:26

## 2018-10-19 RX ADMIN — NIFEDIPINE SCH: 30 TABLET, FILM COATED, EXTENDED RELEASE ORAL at 08:28

## 2019-12-20 ENCOUNTER — HOSPITAL ENCOUNTER (EMERGENCY)
Dept: HOSPITAL 92 - ERS | Age: 79
Discharge: HOME | End: 2019-12-20
Payer: MEDICARE

## 2019-12-20 DIAGNOSIS — E11.9: ICD-10-CM

## 2019-12-20 DIAGNOSIS — S01.81XA: Primary | ICD-10-CM
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
## 2019-12-20 DIAGNOSIS — J44.9: ICD-10-CM

## 2019-12-20 PROCEDURE — 70450 CT HEAD/BRAIN W/O DYE: CPT

## 2019-12-20 PROCEDURE — 12011 RPR F/E/E/N/L/M 2.5 CM/<: CPT

## 2019-12-20 PROCEDURE — 72125 CT NECK SPINE W/O DYE: CPT

## 2019-12-20 NOTE — CT
PRELIMINARY REPORT/[facility]/AFTER HOURS PROCEDURE



EXAM: 

CT Cervical Spine Without Intravenous Contrast. 



CLINICAL HISTORY: 

MEj presented to the ED via the EMS with a c/o fall in the bathroom onset around 3-4 hours ago. EMS r
eports pt has 2 lacerations on the head. EMS reports pt's wife found him in the bathroom and

called. Pt reports he walks poorly and normally uses a cane. Pt reports he didn't use the walker and 
fell. Pt reports he hit his head on the toilet, denies the toilet is broken. Pt denies LOC. Pt

reports he laid in the bathroom for 3 hours before his wife found him. Pt denies pain at the moment. 
Pt reports he drank 4 ounces of alcohol last night and reports he drinks every night. 



TECHNIQUE: 

Axial computed tomography images of the cervical spine without intravenous contrast. Sagittal and cor
onal reformations performed. 



COMPARISON: 

None provided. 



FINDINGS: 

BONES: The patient has undergone posterior decompression at C3. 



No findings of acute cervical spine fracture. 



Spine degenerative changes resulting in varying degrees of central canal and neural foraminal stenosi
s. 



SOFT TISSUES: No prevertebral soft tissue swelling. No apical pneumothorax. 



Atherosclerosis.



Azygos lobe/fissure, a normal variant. 



IMPRESSION: 

1. No findings of acute cervical spine fracture. 

2. Spine degenerative changes resulting in varying degrees of central canal and neural foraminal sten
osis. 

3. Atherosclerosis.





ELECTRONICALLY SIGNED BY:



Tr Agustin MD

Dec 20, 2019 3:27:48 AM CST



This report is intended for review by the ordering physician only, in accordance of law. If you recei
ve this report in error, please call [facility] at [phone].





FINAL REPORT



CT CERVICAL SPINE WITHOUT CONTRAST:



HISTORY:

Trauma.



COMPARISON:

None.



FINDINGS/IMPRESSION:

Concordant with the preliminary report.



CODE QA



Transcribed Date/Time: 12/20/2019 7:54 AM



Reported By: Nilson Wright 

Electronically Signed:  12/20/2019 8:20 AM

## 2019-12-20 NOTE — CT
PRELIMINARY REPORT/[facility]/AFTER HOURS PROCEDURE



EXAM: 

CT Head Without Intravenous Contrast. 



CLINICAL HISTORY: 

M79 presented to the ED via the EMS with a c/o fall in the bathroom onset around 3-4 hours ago. EMS r
eports pt has 2 lacerations on the head. EMS reports pt's wife found him in the bathroom and

called. Pt reports he walks poorly and normally uses a cane. Pt reports he didn't use the walker and 
fell. Pt reports he hit his head on the toilet, denies the toilet is broken. Pt denies LOC. Pt

reports he laid in the bathroom for 3 hours before his wife found him. Pt denies pain at the moment. 
Pt reports he drank 4 ounces of alcohol last night and reports he drinks every night. 



TECHNIQUE: 

Axial computed tomography images of the head/brain without intravenous contrast. 



COMPARISON: 

None provided. 



FINDINGS

BRAIN: Foci of encephalomalacia in the bilateral frontal lobes. The focus in the left frontal lobe ma
y reflect old infarct. The focus in the right frontal lobe is likely related to postoperative

change. Diffuse cerebral atrophy. There are subcortical and deep white matter hypodensities which are
 nonspecific but which statistically most likely reflect changes of chronic small vessel ischemic

disease. 



ORBITS: The patient has undergone bilateral ocular lens replacement procedures. 



SINUSES AND MASTOIDS: Pronounced frontal, ethmoid, and sphenoid sinus disease. 



SOFT TISSUES: High right frontal scalp hematoma and laceration with a few bubbles of soft tissue gas.
 



BONES: No acute skull fracture. 



MISCELLANEOUS: Findings of prior right frontal craniotomy. 



IMPRESSION:

1. High right frontal scalp hematoma and laceration with a few bubbles of soft tissue gas. 



2. Foci of encephalomalacia in the bilateral frontal lobes. The focus in the left frontal lobe may re
flect old infarct. The focus in the right frontal lobe is likely related to postoperative change. 



3. Diffuse cerebral atrophy. 



4. There are subcortical and deep white matter hypodensities which are nonspecific but which statisti
eliseo most likely reflect changes of chronic small vessel ischemic disease. 



5. Pronounced frontal, ethmoid, and sphenoid sinus disease.





ELECTRONICALLY SIGNED BY:



Tr Agustin MD

Dec 20, 2019 3:09:34 AM CST



This report is intended for review by the ordering physician only, in accordance of law. If you recei
ve this report in error, please call [facility] at [phone].





FINAL REPORT



CT BRAIN WITHOUT CONTRAST:



HISTORY:

Trauma.



COMPARISON:

CT brain from 2010.



FINDINGS/IMPRESSION:

Findings and impression are concordant with the preliminary report.



CODE QA



Transcribed Date/Time: 12/20/2019 7:57 AM



Reported By: Nilson Wright 

Electronically Signed:  12/20/2019 8:20 AM

## 2020-01-11 ENCOUNTER — HOSPITAL ENCOUNTER (INPATIENT)
Dept: HOSPITAL 92 - ERS | Age: 80
LOS: 2 days | Discharge: HOME | DRG: 871 | End: 2020-01-13
Attending: EMERGENCY MEDICINE | Admitting: INTERNAL MEDICINE
Payer: MEDICARE

## 2020-01-11 VITALS — BODY MASS INDEX: 27 KG/M2

## 2020-01-11 DIAGNOSIS — Z88.0: ICD-10-CM

## 2020-01-11 DIAGNOSIS — N39.0: ICD-10-CM

## 2020-01-11 DIAGNOSIS — Z96.651: ICD-10-CM

## 2020-01-11 DIAGNOSIS — A41.51: Primary | ICD-10-CM

## 2020-01-11 DIAGNOSIS — G93.41: ICD-10-CM

## 2020-01-11 DIAGNOSIS — Z85.118: ICD-10-CM

## 2020-01-11 DIAGNOSIS — I10: ICD-10-CM

## 2020-01-11 DIAGNOSIS — Z66: ICD-10-CM

## 2020-01-11 DIAGNOSIS — Z88.6: ICD-10-CM

## 2020-01-11 DIAGNOSIS — Z79.899: ICD-10-CM

## 2020-01-11 DIAGNOSIS — J44.9: ICD-10-CM

## 2020-01-11 LAB
ALBUMIN SERPL BCG-MCNC: 3.4 G/DL (ref 3.4–4.8)
ALP SERPL-CCNC: 67 U/L (ref 40–110)
ALT SERPL W P-5'-P-CCNC: 10 U/L (ref 8–55)
ANION GAP SERPL CALC-SCNC: 13 MMOL/L (ref 10–20)
AST SERPL-CCNC: 14 U/L (ref 5–34)
BACTERIA UR QL AUTO: (no result) HPF
BASOPHILS # BLD AUTO: 0.1 THOU/UL (ref 0–0.2)
BASOPHILS NFR BLD AUTO: 0.7 % (ref 0–1)
BILIRUB SERPL-MCNC: 1.3 MG/DL (ref 0.2–1.2)
BUN SERPL-MCNC: 13 MG/DL (ref 8.4–25.7)
CALCIUM SERPL-MCNC: 8.2 MG/DL (ref 7.8–10.44)
CHLORIDE SERPL-SCNC: 108 MMOL/L (ref 98–107)
CO2 SERPL-SCNC: 19 MMOL/L (ref 23–31)
CREAT CL PREDICTED SERPL C-G-VRATE: 0 ML/MIN (ref 70–130)
EOSINOPHIL # BLD AUTO: 0 THOU/UL (ref 0–0.7)
EOSINOPHIL NFR BLD AUTO: 0.5 % (ref 0–10)
GLOBULIN SER CALC-MCNC: 2.5 G/DL (ref 2.4–3.5)
GLUCOSE SERPL-MCNC: 96 MG/DL (ref 83–110)
HGB BLD-MCNC: 12.7 G/DL (ref 14–18)
LEUKOCYTE ESTERASE UR QL STRIP.AUTO: 500 LEU/UL
LYMPHOCYTES # BLD: 0.2 THOU/UL (ref 1.2–3.4)
LYMPHOCYTES NFR BLD AUTO: 2.7 % (ref 21–51)
MCH RBC QN AUTO: 35.6 PG (ref 27–31)
MCV RBC AUTO: 102 FL (ref 78–98)
MONOCYTES # BLD AUTO: 0.1 THOU/UL (ref 0.11–0.59)
MONOCYTES NFR BLD AUTO: 1.3 % (ref 0–10)
NEUTROPHILS # BLD AUTO: 8.6 THOU/UL (ref 1.4–6.5)
NEUTROPHILS NFR BLD AUTO: 94.8 % (ref 42–75)
PLATELET # BLD AUTO: 235 THOU/UL (ref 130–400)
POTASSIUM SERPL-SCNC: 4 MMOL/L (ref 3.5–5.1)
PROT UR STRIP.AUTO-MCNC: 70 MG/DL
RBC # BLD AUTO: 3.57 MILL/UL (ref 4.7–6.1)
RBC UR QL AUTO: (no result) HPF (ref 0–3)
SODIUM SERPL-SCNC: 136 MMOL/L (ref 136–145)
WBC # BLD AUTO: 9 THOU/UL (ref 4.8–10.8)

## 2020-01-11 PROCEDURE — 87804 INFLUENZA ASSAY W/OPTIC: CPT

## 2020-01-11 PROCEDURE — 81003 URINALYSIS AUTO W/O SCOPE: CPT

## 2020-01-11 PROCEDURE — 87077 CULTURE AEROBIC IDENTIFY: CPT

## 2020-01-11 PROCEDURE — 83605 ASSAY OF LACTIC ACID: CPT

## 2020-01-11 PROCEDURE — 71045 X-RAY EXAM CHEST 1 VIEW: CPT

## 2020-01-11 PROCEDURE — 80048 BASIC METABOLIC PNL TOTAL CA: CPT

## 2020-01-11 PROCEDURE — 87040 BLOOD CULTURE FOR BACTERIA: CPT

## 2020-01-11 PROCEDURE — 87149 DNA/RNA DIRECT PROBE: CPT

## 2020-01-11 PROCEDURE — 36415 COLL VENOUS BLD VENIPUNCTURE: CPT

## 2020-01-11 PROCEDURE — 80053 COMPREHEN METABOLIC PANEL: CPT

## 2020-01-11 PROCEDURE — 87086 URINE CULTURE/COLONY COUNT: CPT

## 2020-01-11 PROCEDURE — 96368 THER/DIAG CONCURRENT INF: CPT

## 2020-01-11 PROCEDURE — 85025 COMPLETE CBC W/AUTO DIFF WBC: CPT

## 2020-01-11 PROCEDURE — 96365 THER/PROPH/DIAG IV INF INIT: CPT

## 2020-01-11 PROCEDURE — 81015 MICROSCOPIC EXAM OF URINE: CPT

## 2020-01-11 PROCEDURE — 87186 SC STD MICRODIL/AGAR DIL: CPT

## 2020-01-11 RX ADMIN — MOMETASONE FUROATE AND FORMOTEROL FUMARATE DIHYDRATE SCH: 200; 5 AEROSOL RESPIRATORY (INHALATION) at 22:31

## 2020-01-11 RX ADMIN — MOMETASONE FUROATE AND FORMOTEROL FUMARATE DIHYDRATE SCH PUFF: 200; 5 AEROSOL RESPIRATORY (INHALATION) at 10:07

## 2020-01-11 RX ADMIN — CEFTRIAXONE SCH MLS: 1 INJECTION, POWDER, FOR SOLUTION INTRAMUSCULAR; INTRAVENOUS at 10:14

## 2020-01-11 RX ADMIN — ASPIRIN SCH MG: 81 TABLET ORAL at 20:27

## 2020-01-11 RX ADMIN — NIFEDIPINE SCH MG: 60 TABLET, EXTENDED RELEASE ORAL at 20:29

## 2020-01-11 NOTE — HP
PRIMARY CARE PHYSICIAN:  Dr. Ronni Ortega.



CHIEF COMPLAINT:  "I woke up, freezing and shaking."



HISTORY OF PRESENT ILLNESS:  Mr. Chew is a pleasant 79-year-old gentleman, who

has a history of COPD and pulmonary hypertension and recently had a prostate biopsy

done about 2 days ago.  He says he was in his usual state of health until early this

morning that he woke up, freezing, and shaking.  He says his stomach was upset a bit

as well.  He also felt extremely weak and he noticed that he had a fever.  His wife

was concerned, because she noticed that he had an episode of a fall according to the

ER records and the patient also noted some dysuria.  As a result, he was brought to

the emergency room and found to have a rectal temperature of a 103.  His heart rate

was elevated.  His urine had signs consistent with urinary tract infection and he is

being admitted for further treatment.  The patient denies any other symptoms such as

chest pain or shortness of breath.  No nausea, no vomiting.  No cough or congestion,

sore throat, etc. 



REVIEW OF SYSTEMS:  All systems were reviewed and are negative except for that

mentioned in the history of present illness. 



PAST MEDICAL HISTORY:  Significant for hypertension, COPD, lung cancer, and recent

prostate biopsy. 



PAST SURGICAL HISTORY:  He has had a right total knee replacement, prostatic biopsy,

and a left lung lobectomy. 



ALLERGIES:  IBUPROFEN AND PENICILLIN.  HE SAYS HE IS NOT SURE WHAT HAPPENED WITH

PENICILLIN.  HE SAYS HE THINKS IT UPSETS HIS STOMACH.  HE SAYS FOR SURE THERE WAS NO

RASH OR ANY TYPE OF TONGUE OR THROAT SWELLING. 



SOCIAL HISTORY:  He is .  He has 4 children.  He is a former smoker.  He quit

20 years ago.  Prior to that, he smoked about a pack a day for 20 years and he

occasionally drinks [place].  No drug use, and his code status is DNAR. 



FAMILY HISTORY:  Significant for cancer in his mother.



MEDICATIONS:  Include;

1. Ambien 5 mg daily.

2. Simvastatin 20 mg daily.

3. Flomax 0.4 mg daily.

4. Nifedipine extended release 60 mg daily.

5. Celebrex 200 mg daily.

6. Zoloft 100 mg daily.

7. Lisinopril 20 mg daily.

8. Keppra 500 mg twice a day.

9. Potassium chloride 10 mEq daily.

10. Lasix 20 mg daily.

11. Tylenol 325 mg as needed.



PHYSICAL EXAMINATION:

GENERAL:  He is alert and oriented.  He appears to be in no acute distress.  He is

well developed and well nourished. 

VITAL SIGNS:  Blood pressure is 128/80, heart rate 96, heart rate of 123,

respiratory rate of 20, temperature was 103.1. 

HEENT:  Pupils are equal, round, and reactive to light.  Extraocular muscles are

intact.  His sclerae anicteric.  Throat, no erythema, no exudates. 

NECK:  No adenopathy.  No bruits. 

LUNGS:  Clear to auscultation.  There is no wheezing.  No rales.  No rhonchi.

CARDIOVASCULAR:  He has a normal S1 and S2.  I did not appreciate an S3 or S4.  No

murmurs, clicks, no rubs. 

ABDOMEN:  Obese.  It is soft.  Tympanic to percussion.  There is no tenderness.  No

organomegaly. 

EXTREMITIES:  No clubbing or cyanosis.  No edema.  No calf tenderness.  No joint

effusions. 

NEUROLOGIC:  The exam is grossly nonfocal.



LABORATORY RESULTS:  Urinalysis; the pH was 6.5.  There is 2+ nitrites, greater than

50,000 wbc's, and 4+ bacteria.  On his CBC, the white blood cell count is 9,

hemoglobin 12.7, hematocrit is 36.5, platelet count is 235.  Sodium 136, potassium

4.0, chloride is 108, CO2 is 19, BUN of 13, creatinine 1.0, glucose is 96, lactic

acid 3.4, bilirubin is 1.3.  Unfortunately, I do not see an image, but the report

is, there is no evidence of any infiltrates or effusions. 



ASSESSMENT:  

1. This is a pleasant 79-year-old gentleman, who presented to the emergency room

with generalized weakness.  He was found to have findings consistent with urinary

tract infection.  He is being admitted.  He will be placed on IV antibiotics.  We

will start him on Rocephin considering the allergy to penicillin does not appear to

be a true allergy and await the results of the urine culture.  Continue IV fluid

resuscitation.  He has already been given 2 L in the ER, therefore, we will place

him on 75 to 100 mL/h. 

2. Hypertension.  We will restart his home medications when appropriate and have

p.r.n. medicines available as needed. 

3. Chronic obstructive pulmonary disease, have DuoNebs available.

4. He will be placed on DVT and GI prophylaxis.







Job ID:  [id number]

## 2020-01-11 NOTE — RAD
XR Chest 1 View Portable



History: Chest pain



Comparison: Multiple prior exams, most recent 2018



Findings: Lungs are clear. There appears be a peripherally calcified mass adjacent to the transverse 
aorta. This is similar to the comparison examination.



No confluent airspace consolidation, pneumothorax, or effusion.



Impression: Findings of transverse aortic aneurysm, unchanged from comparison exams.



Reported By: Nilson Wright 

Electronically Signed:  1/11/2020 8:08 AM

## 2020-01-12 LAB
ANION GAP SERPL CALC-SCNC: 10 MMOL/L (ref 10–20)
BASOPHILS # BLD AUTO: 0 THOU/UL (ref 0–0.2)
BASOPHILS NFR BLD AUTO: 0.4 % (ref 0–1)
BUN SERPL-MCNC: 12 MG/DL (ref 8.4–25.7)
CALCIUM SERPL-MCNC: 8.3 MG/DL (ref 7.8–10.44)
CHLORIDE SERPL-SCNC: 110 MMOL/L (ref 98–107)
CO2 SERPL-SCNC: 21 MMOL/L (ref 23–31)
CREAT CL PREDICTED SERPL C-G-VRATE: 100 ML/MIN (ref 70–130)
EOSINOPHIL # BLD AUTO: 0.1 THOU/UL (ref 0–0.7)
EOSINOPHIL NFR BLD AUTO: 1.2 % (ref 0–10)
GLUCOSE SERPL-MCNC: 94 MG/DL (ref 83–110)
HGB BLD-MCNC: 12.1 G/DL (ref 14–18)
LYMPHOCYTES # BLD: 0.9 THOU/UL (ref 1.2–3.4)
LYMPHOCYTES NFR BLD AUTO: 7.8 % (ref 21–51)
MCH RBC QN AUTO: 33.7 PG (ref 27–31)
MCV RBC AUTO: 101 FL (ref 78–98)
MONOCYTES # BLD AUTO: 0.6 THOU/UL (ref 0.11–0.59)
MONOCYTES NFR BLD AUTO: 5.7 % (ref 0–10)
NEUTROPHILS # BLD AUTO: 9.3 THOU/UL (ref 1.4–6.5)
NEUTROPHILS NFR BLD AUTO: 85 % (ref 42–75)
PLATELET # BLD AUTO: 196 THOU/UL (ref 130–400)
POTASSIUM SERPL-SCNC: 3.3 MMOL/L (ref 3.5–5.1)
RBC # BLD AUTO: 3.59 MILL/UL (ref 4.7–6.1)
SODIUM SERPL-SCNC: 138 MMOL/L (ref 136–145)
WBC # BLD AUTO: 10.9 THOU/UL (ref 4.8–10.8)

## 2020-01-12 RX ADMIN — MOMETASONE FUROATE AND FORMOTEROL FUMARATE DIHYDRATE SCH PUFF: 200; 5 AEROSOL RESPIRATORY (INHALATION) at 06:32

## 2020-01-12 RX ADMIN — MOMETASONE FUROATE AND FORMOTEROL FUMARATE DIHYDRATE SCH PUFF: 200; 5 AEROSOL RESPIRATORY (INHALATION) at 20:19

## 2020-01-12 RX ADMIN — ASPIRIN SCH MG: 81 TABLET ORAL at 08:39

## 2020-01-12 RX ADMIN — ASPIRIN SCH MG: 81 TABLET ORAL at 20:12

## 2020-01-12 RX ADMIN — CEFTRIAXONE SCH MLS: 1 INJECTION, POWDER, FOR SOLUTION INTRAMUSCULAR; INTRAVENOUS at 08:37

## 2020-01-12 RX ADMIN — NIFEDIPINE SCH MG: 60 TABLET, EXTENDED RELEASE ORAL at 20:13

## 2020-01-12 NOTE — PDOC.HOSPP
- Subjective


Encounter Date: 01/12/20


Encounter Time: 14:56


Subjective: 





Mr. Chew was seen today in follow-up of UTI with sepsis. He says he feels 

better. No new complaints.





- Objective


Vital Signs & Weight: 


 Vital Signs (12 hours)











  Temp Pulse Resp BP BP Pulse Ox


 


 01/12/20 08:38     124/72  


 


 01/12/20 08:01  97.9 F  74  18   124/72  96


 


 01/12/20 08:00       96


 


 01/12/20 06:32   76  16    98


 


 01/12/20 04:27  97.6 F  78  19   124/72  95








 Weight











Weight                         194 lb 0.108 oz














I&O: 


 











 01/11/20 01/12/20 01/13/20





 06:59 06:59 06:59


 


Intake Total  1740 240


 


Balance  1740 240











Result Diagrams: 


 01/12/20 05:00





 01/12/20 05:00





Hospitalist ROS





- Medication


Medications: 


Active Medications











Generic Name Dose Route Start Last Admin





  Trade Name Freq  PRN Reason Stop Dose Admin


 


Acetaminophen  650 mg  01/11/20 09:30  01/11/20 21:39





  Tylenol  PO   650 mg





  Q4H PRN   Administration





  Headache/Fever/Mild Pain (1-3)   





     





     





     


 


Aspirin  81 mg  01/11/20 21:00  01/12/20 08:39





  Ecotrin  PO   81 mg





  BID ALEXIA   Administration





     





     





     





     


 


Atorvastatin Calcium  10 mg  01/11/20 21:00  01/11/20 20:28





  Lipitor  PO   10 mg





  HS ALEXIA   Administration





     





     





     





     


 


Enoxaparin Sodium  40 mg  01/12/20 09:00  01/12/20 08:39





  Lovenox  SC   40 mg





  0900 ALEXIA   Administration





     





     





     





     


 


Famotidine  20 mg  01/11/20 21:00  01/12/20 08:38





  Pepcid  PO   20 mg





  BID ALEXIA   Administration





     





     





     





     


 


Ceftriaxone Sodium 1 gm/  100 mls @ 200 mls/hr  01/11/20 10:00  01/12/20 08:37





  Sodium Chloride  IVPB   100 mls





  1000 ALEXIA   Administration





     





     





     





     


 


Sodium Chloride  1,000 mls @ 75 mls/hr  01/11/20 09:30  01/12/20 14:35





  Normal Saline 0.9%  IV   1,000 mls





  [id number] ALEXIA   Administration





     





     





     





     


 


Levetiracetam  500 mg  01/11/20 21:00  01/12/20 08:38





  Keppra  PO   500 mg





  BID ALEXIA   Administration





     





     





     





     


 


Lisinopril  20 mg  01/12/20 09:00  01/12/20 08:38





  Zestril  PO   20 mg





  QAM ALEXIA   Administration





     





     





     





     


 


Mometasone Furoate/Formoterol Fumar  2 puff  01/11/20 18:30  01/12/20 06:32





  Dulera 200 Mcg/5 Mcg Inhaler  INH   2 puff





  BID-RT ALEXIA   Administration





     





     





     





     


 


Nifedipine  60 mg  01/11/20 21:00  01/11/20 20:29





  Procardia Xl  PO   60 mg





  HS ALEXIA   Administration





     





     





     





     


 


Sertraline HCl  100 mg  01/12/20 09:00  01/12/20 08:38





  Zoloft  PO   100 mg





  QAM ALEXIA   Administration





     





     





     





     


 


Tamsulosin HCl  0.4 mg  01/11/20 21:00  01/11/20 20:28





  Flomax  PO   0.4 mg





  HS ALEXIA   Administration





     





     





     





     














- Exam


Eye: PERRL


Heart: RRR, no murmur, no gallops, no rubs, normal peripheral pulses


Respiratory: CTAB, no wheezes, no rales, no ronchi, normal chest expansion, no 

tachypnea, normal percussion


Gastrointestinal: soft, non-tender, non-distended, normal bowel sounds, no 

palpable masses, no hepatomegaly


Extremities: no cyanosis, no clubbing, no edema





Hosp A/P


(1) UTI (urinary tract infection)


Status: Acute   





(2) Sepsis


Code(s): A41.9 - SEPSIS, UNSPECIFIED ORGANISM   Status: Acute   





(3) HTN (hypertension)


Code(s): I10 - ESSENTIAL (PRIMARY) HYPERTENSION   Status: Chronic   


Qualifiers: 


 





(4) Hx of cancer of lung


Code(s): Z85.118 - PERSONAL HISTORY OF MALIGNANT NEOPLASM OF BRONCHUS AND LUNG 

  Status: Chronic   





- Plan





* UTI with sepsis- urine culture is growing presumptive E. Coli


* He is improving on Rocephin- will continue until sensitivities are back


* Lung Cancer- stable


* HTN- blood pressure is stable

## 2020-01-13 VITALS — DIASTOLIC BLOOD PRESSURE: 77 MMHG | SYSTOLIC BLOOD PRESSURE: 159 MMHG

## 2020-01-13 VITALS — TEMPERATURE: 98.2 F

## 2020-01-13 RX ADMIN — ASPIRIN SCH MG: 81 TABLET ORAL at 08:24

## 2020-01-13 RX ADMIN — MOMETASONE FUROATE AND FORMOTEROL FUMARATE DIHYDRATE SCH PUFF: 200; 5 AEROSOL RESPIRATORY (INHALATION) at 07:43

## 2020-01-13 RX ADMIN — CEFTRIAXONE SCH MLS: 1 INJECTION, POWDER, FOR SOLUTION INTRAMUSCULAR; INTRAVENOUS at 08:31

## 2020-01-13 NOTE — DIS
DATE OF ADMISSION:  01/11/2020



DATE OF DISCHARGE:  01/13/2020



PRIMARY CARE PHYSICIAN:  Dr. Ronni Ortega.



DISCHARGE DISPOSITION:  Home.



DISCHARGE DIAGNOSES:  

1. Urinary tract infection.

2. Sepsis.

3. Hypertension.

4. Chronic obstructive pulmonary disease.

5. Lung cancer.

6. Recent prostate biopsy.



DISCHARGE MEDICATIONS:  Include; 

1. Omnicef 300 mg one p.o. twice daily.

2. Aspirin 81 mg daily.

3. Ambien 5 mg p.o. at bedtime.

4. Flomax 0.4 mg at bedtime.

5. Simvastatin 20 mg p.o. at bedtime.

6. Zoloft 100 mg daily.

7. K-Dur 10 mEq as directed.

8. Nifedipine extended release 60 mg at bedtime.

9. Dulera 200/5 one inhalation twice a day.

10. Keppra 500 mg twice daily.

11. Lasix 20 mg as directed.

12. Celebrex 200 mg daily.

13. Tylenol as needed.



CODE STATUS:  DNAR.



ALLERGIES:  TO PENICILLIN AND IBUPROFEN.



HOSPITAL COURSE:  Mr. Chew is a pleasant 79-year-old gentleman, who presented to

the emergency room with extreme weakness and mild confusion.  He also had subjective

fevers and chills.  He was admitted with urinary tract infection with sepsis.  He

had a temperature of 101 as high as 103, that is in the ER and he was hypotensive

with an elevated lactic acid.  He was started on empiric antibiotics and urine

culture grew E coli, which was sensitive to cephalosporins.  He was successfully

treated with Rocephin and then transitioned to Omnicef and is being discharged home

in stable condition to have close outpatient followup.  He has been instructed to

see his primary care physician within the next 3 to 4 days and also to follow up

with his urologist as instructed. 







Job ID:  [id number]

## 2020-01-13 NOTE — PDOC.HOSPP
- Subjective


Encounter Date: 01/13/20


Encounter Time: 14:33


Subjective: 





Mr. Chew was seen today in follow-up of UTI with sepsis. He is much 

improved. No complaints.





- Objective


Vital Signs & Weight: 


 Vital Signs (12 hours)











  Temp Pulse Resp BP BP Pulse Ox


 


 01/13/20 08:33  98.2 F  98  18   128/79  95


 


 01/13/20 08:27     136/77  


 


 01/13/20 08:00       95


 


 01/13/20 04:00  98 F  96  18   159/75 H  95








 Weight











Admit Weight                   194 lb 0.108 oz


 


Weight                         194 lb 0.108 oz














I&O: 


 











 01/12/20 01/13/20 01/14/20





 06:59 06:59 06:59


 


Intake Total 1740 1820 


 


Output Total  1350 


 


Balance 1740 470 











Result Diagrams: 


 01/12/20 05:00





 01/12/20 05:00





Hospitalist ROS





- Medication


Medications: 


Active Medications











Generic Name Dose Route Start Last Admin





  Trade Name Freq  PRN Reason Stop Dose Admin


 


Acetaminophen  650 mg  01/11/20 09:30  01/11/20 21:39





  Tylenol  PO   650 mg





  Q4H PRN   Administration





  Headache/Fever/Mild Pain (1-3)   





     





     





     


 


Aspirin  81 mg  01/11/20 21:00  01/13/20 08:24





  Ecotrin  PO   81 mg





  BID ALEXIA   Administration





     





     





     





     


 


Atorvastatin Calcium  10 mg  01/11/20 21:00  01/12/20 20:12





  Lipitor  PO   10 mg





  HS ALEXIA   Administration





     





     





     





     


 


Enoxaparin Sodium  40 mg  01/12/20 09:00  01/13/20 08:26





  Lovenox  SC   40 mg





  0900 ALEXIA   Administration





     





     





     





     


 


Famotidine  20 mg  01/11/20 21:00  01/13/20 08:26





  Pepcid  PO   20 mg





  BID ALEXIA   Administration





     





     





     





     


 


Ceftriaxone Sodium 1 gm/  100 mls @ 200 mls/hr  01/11/20 10:00  01/13/20 08:31





  Sodium Chloride  IVPB   100 mls





  1000 ALEXIA   Administration





     





     





     





     


 


Sodium Chloride  1,000 mls @ 75 mls/hr  01/11/20 09:30  01/13/20 01:58





  Normal Saline 0.9%  IV   1,000 mls





  .[id number] ALEXIA   Administration





     





     





     





     


 


Levetiracetam  500 mg  01/11/20 21:00  01/13/20 08:27





  Keppra  PO   500 mg





  BID ALEXIA   Administration





     





     





     





     


 


Lisinopril  20 mg  01/12/20 09:00  01/13/20 08:27





  Zestril  PO   20 mg





  QAM ALEXIA   Administration





     





     





     





     


 


Mometasone Furoate/Formoterol Fumar  2 puff  01/11/20 18:30  01/13/20 07:43





  Dulera 200 Mcg/5 Mcg Inhaler  INH   2 puff





  BID-RT ALEXIA   Administration





     





     





     





     


 


Nifedipine  60 mg  01/11/20 21:00  01/12/20 20:13





  Procardia Xl  PO   60 mg





  HS ALEXIA   Administration





     





     





     





     


 


Sertraline HCl  100 mg  01/12/20 09:00  01/13/20 08:26





  Zoloft  PO   100 mg





  QAM ALEXIA   Administration





     





     





     





     


 


Tamsulosin HCl  0.4 mg  01/11/20 21:00  01/12/20 20:15





  Flomax  PO   0.4 mg





  HS ALEXIA   Administration





     





     





     





     


 


Zolpidem Tartrate  5 mg  01/12/20 21:00  01/12/20 20:16





  Ambien  PO   5 mg





  HS ALEXIA   Administration





     





     





     





     














- Exam


Eye: PERRL


Heart: RRR, no murmur, no gallops, no rubs, normal peripheral pulses


Respiratory: CTAB, no wheezes, no rales, no ronchi, normal chest expansion, no 

tachypnea, normal percussion


Gastrointestinal: soft, non-tender, non-distended, normal bowel sounds, no 

palpable masses, no hepatomegaly, no splenomegaly


Extremities: no cyanosis, no clubbing, no edema





Hosp A/P


(1) UTI (urinary tract infection)


Status: Acute   





(2) Sepsis


Code(s): A41.9 - SEPSIS, UNSPECIFIED ORGANISM   Status: Acute   





(3) HTN (hypertension)


Code(s): I10 - ESSENTIAL (PRIMARY) HYPERTENSION   Status: Chronic   


Qualifiers: 


 





(4) Hx of cancer of lung


Code(s): Z85.118 - PERSONAL HISTORY OF MALIGNANT NEOPLASM OF BRONCHUS AND LUNG 

  Status: Chronic   





- Plan





* UTI with sepsis-Urine culture grew E. coli- which is sensitive to 

Cephalosporins


* HTN- blood pressure is stable


* Stable for discharge home

## 2020-01-15 NOTE — PQF
LIZBET CERDA TONI MD

[id number]                                                             [id number]
[id number]

[id number]                             

                                   

CLINICAL DOCUMENTATION CLARIFICATION FORM:  POST DISCHARGE



Addendum to original discharge summary date:  __________________________________
____



Late entry note date:  _________________________________________________________
__











DATE:1/15/2020                                         ATTN: Donnell Corrigan



Please exercise your independent, professional judgment in responding to the 
clarification form. 

Clinical indicators are provided on the bottom of this form for your review



Please check appropriate box(s):



[ X ] Acute Metabolic  Encephalopathy

[  ] Acute Toxic  Encephalopathy

[  ] Transient Alteration of Awareness 

[  ] Other diagnosis ___________

[  ] Unable to determine



In addition, please specify:

Present on Admission (POA):  [  X] Yes             [  ] No             [  ] 
Unable to determine



For continuity of documentation, please document condition throughout progress 
notes and discharge summary.  Thank You.



CLINICAL INDICATORS - SIGNS / SYMPTOMS / LABS

ED notes p2 1/11 SIRS scoring : Nany, pt did meet criteria

Hospitalist PN p1 1/12 Dr Forman Pt seen today in follow up of UTI with Sepsis

DS p1 1/13 Dr Forman Presented to ER with extreme weakness and mild confusion







RISK FACTORS

ED notes p7 1/11  Sepsis

H&P p1 1/11  - 79 year-old male

H&P p3 1/11  - UTI





TREATMENTS:

MAR 1/11  IV fluid resuscitation

MAR 1/11  IV Rocephin











(This form is maintained as a part of the permanent medical record)

2015 [employer]  All Rights Reserved

[email or web address]    [not provided]

                                                              



 



MTDD

## 2020-06-08 ENCOUNTER — HOSPITAL ENCOUNTER (INPATIENT)
Dept: HOSPITAL 92 - ERS | Age: 80
LOS: 3 days | Discharge: HOME | DRG: 872 | End: 2020-06-11
Attending: FAMILY MEDICINE | Admitting: FAMILY MEDICINE
Payer: MEDICARE

## 2020-06-08 VITALS — BODY MASS INDEX: 25.4 KG/M2

## 2020-06-08 DIAGNOSIS — R19.7: ICD-10-CM

## 2020-06-08 DIAGNOSIS — J44.9: ICD-10-CM

## 2020-06-08 DIAGNOSIS — Z88.0: ICD-10-CM

## 2020-06-08 DIAGNOSIS — Z85.46: ICD-10-CM

## 2020-06-08 DIAGNOSIS — A41.51: Primary | ICD-10-CM

## 2020-06-08 DIAGNOSIS — C34.90: ICD-10-CM

## 2020-06-08 DIAGNOSIS — N39.0: ICD-10-CM

## 2020-06-08 DIAGNOSIS — I45.81: ICD-10-CM

## 2020-06-08 DIAGNOSIS — G40.909: ICD-10-CM

## 2020-06-08 DIAGNOSIS — R65.20: ICD-10-CM

## 2020-06-08 DIAGNOSIS — Z88.6: ICD-10-CM

## 2020-06-08 DIAGNOSIS — E11.9: ICD-10-CM

## 2020-06-08 DIAGNOSIS — Z87.891: ICD-10-CM

## 2020-06-08 DIAGNOSIS — I10: ICD-10-CM

## 2020-06-08 LAB
ALBUMIN SERPL BCG-MCNC: 3.4 G/DL (ref 3.4–4.8)
ALP SERPL-CCNC: 74 U/L (ref 40–110)
ALT SERPL W P-5'-P-CCNC: 9 U/L (ref 8–55)
ANION GAP SERPL CALC-SCNC: 15 MMOL/L (ref 10–20)
AST SERPL-CCNC: 15 U/L (ref 5–34)
BASOPHILS # BLD AUTO: 0 THOU/UL (ref 0–0.2)
BASOPHILS NFR BLD AUTO: 0.5 % (ref 0–1)
BILIRUB SERPL-MCNC: 0.4 MG/DL (ref 0.2–1.2)
BUN SERPL-MCNC: 11 MG/DL (ref 8.4–25.7)
CALCIUM SERPL-MCNC: 8.8 MG/DL (ref 7.8–10.44)
CHLORIDE SERPL-SCNC: 102 MMOL/L (ref 98–107)
CO2 SERPL-SCNC: 25 MMOL/L (ref 23–31)
CREAT CL PREDICTED SERPL C-G-VRATE: 0 ML/MIN (ref 70–130)
EOSINOPHIL # BLD AUTO: 0.2 THOU/UL (ref 0–0.7)
EOSINOPHIL NFR BLD AUTO: 2.6 % (ref 0–10)
GLOBULIN SER CALC-MCNC: 3.5 G/DL (ref 2.4–3.5)
GLUCOSE SERPL-MCNC: 102 MG/DL (ref 83–110)
HGB BLD-MCNC: 13.5 G/DL (ref 14–18)
LYMPHOCYTES # BLD: 0.9 THOU/UL (ref 1.2–3.4)
LYMPHOCYTES NFR BLD AUTO: 12.4 % (ref 21–51)
MCH RBC QN AUTO: 33 PG (ref 27–31)
MCV RBC AUTO: 95.6 FL (ref 78–98)
MONOCYTES # BLD AUTO: 0.3 THOU/UL (ref 0.11–0.59)
MONOCYTES NFR BLD AUTO: 3.5 % (ref 0–10)
NEUTROPHILS # BLD AUTO: 5.9 THOU/UL (ref 1.4–6.5)
NEUTROPHILS NFR BLD AUTO: 80.9 % (ref 42–75)
PLATELET # BLD AUTO: 414 THOU/UL (ref 130–400)
POTASSIUM SERPL-SCNC: 3.2 MMOL/L (ref 3.5–5.1)
RBC # BLD AUTO: 4.09 MILL/UL (ref 4.7–6.1)
SODIUM SERPL-SCNC: 139 MMOL/L (ref 136–145)
WBC # BLD AUTO: 7.3 THOU/UL (ref 4.8–10.8)

## 2020-06-08 PROCEDURE — 80048 BASIC METABOLIC PNL TOTAL CA: CPT

## 2020-06-08 PROCEDURE — U0003 INFECTIOUS AGENT DETECTION BY NUCLEIC ACID (DNA OR RNA); SEVERE ACUTE RESPIRATORY SYNDROME CORONAVIRUS 2 (SARS-COV-2) (CORONAVIRUS DISEASE [COVID-19]), AMPLIFIED PROBE TECHNIQUE, MAKING USE OF HIGH THROUGHPUT TECHNOLOGIES AS DESCRIBED BY CMS-2020-01-R: HCPCS

## 2020-06-08 PROCEDURE — 87449 NOS EACH ORGANISM AG IA: CPT

## 2020-06-08 PROCEDURE — 71045 X-RAY EXAM CHEST 1 VIEW: CPT

## 2020-06-08 PROCEDURE — 83036 HEMOGLOBIN GLYCOSYLATED A1C: CPT

## 2020-06-08 PROCEDURE — 81015 MICROSCOPIC EXAM OF URINE: CPT

## 2020-06-08 PROCEDURE — 84484 ASSAY OF TROPONIN QUANT: CPT

## 2020-06-08 PROCEDURE — 83605 ASSAY OF LACTIC ACID: CPT

## 2020-06-08 PROCEDURE — 87040 BLOOD CULTURE FOR BACTERIA: CPT

## 2020-06-08 PROCEDURE — 93005 ELECTROCARDIOGRAM TRACING: CPT

## 2020-06-08 PROCEDURE — 87086 URINE CULTURE/COLONY COUNT: CPT

## 2020-06-08 PROCEDURE — 80053 COMPREHEN METABOLIC PANEL: CPT

## 2020-06-08 PROCEDURE — 85025 COMPLETE CBC W/AUTO DIFF WBC: CPT

## 2020-06-08 PROCEDURE — 36415 COLL VENOUS BLD VENIPUNCTURE: CPT

## 2020-06-08 PROCEDURE — 87635 SARS-COV-2 COVID-19 AMP PRB: CPT

## 2020-06-08 PROCEDURE — 36416 COLLJ CAPILLARY BLOOD SPEC: CPT

## 2020-06-08 PROCEDURE — 87077 CULTURE AEROBIC IDENTIFY: CPT

## 2020-06-08 PROCEDURE — 87186 SC STD MICRODIL/AGAR DIL: CPT

## 2020-06-08 PROCEDURE — 93010 ELECTROCARDIOGRAM REPORT: CPT

## 2020-06-08 PROCEDURE — 94640 AIRWAY INHALATION TREATMENT: CPT

## 2020-06-08 PROCEDURE — 84145 PROCALCITONIN (PCT): CPT

## 2020-06-08 PROCEDURE — 81003 URINALYSIS AUTO W/O SCOPE: CPT

## 2020-06-08 PROCEDURE — 87324 CLOSTRIDIUM AG IA: CPT

## 2020-06-08 NOTE — RAD
XR Chest 1 View Portable



HISTORY: Fever and chills



COMPARISON: 1/11/2020



FINDINGS: The heart size is normal. The aorta is tortuous The lungs are well expanded without focal a
reas of consolidation, pneumothorax or pleural effusions.



IMPRESSION: No radiographic evidence of acute cardiopulmonary process.



Reported By: Arun Zapata 

Electronically Signed:  6/8/2020 11:33 PM

## 2020-06-09 LAB
BACTERIA UR QL AUTO: (no result) HPF
LEUKOCYTE ESTERASE UR QL STRIP.AUTO: 75 LEU/UL
PROT UR STRIP.AUTO-MCNC: 30 MG/DL
RBC UR QL AUTO: (no result) HPF (ref 0–3)

## 2020-06-09 RX ADMIN — MOMETASONE FUROATE AND FORMOTEROL FUMARATE DIHYDRATE SCH PUFF: 200; 5 AEROSOL RESPIRATORY (INHALATION) at 22:08

## 2020-06-09 RX ADMIN — CEFTRIAXONE SCH MLS: 2 INJECTION, POWDER, FOR SOLUTION INTRAMUSCULAR; INTRAVENOUS at 23:00

## 2020-06-09 RX ADMIN — Medication SCH ML: at 22:08

## 2020-06-09 RX ADMIN — MOMETASONE FUROATE AND FORMOTEROL FUMARATE DIHYDRATE SCH: 200; 5 AEROSOL RESPIRATORY (INHALATION) at 06:30

## 2020-06-09 RX ADMIN — NIFEDIPINE SCH MG: 60 TABLET, EXTENDED RELEASE ORAL at 20:00

## 2020-06-09 RX ADMIN — Medication SCH ML: at 09:10

## 2020-06-09 NOTE — PDOC.FPRHP
- Allergies/Adverse Reactions


 Allergies











Allergy/AdvReac Type Severity Reaction Status Date / Time


 


Penicillins Allergy  Rash Verified 03/23/20 13:51


 


ibuprofen AdvReac  BLEEDING Verified 03/23/20 13:51





   ULCERS  














- Home Medications


 











 Medication  Instructions  Recorded  Confirmed  Type


 


Celecoxib [Celebrex] 200 mg PO QAM 01/18/18 06/09/20 History


 


Lisinopril 20 mg PO QAM 01/18/18 06/09/20 History


 


NIFEdipine [Nifedipine ER] 60 mg PO HS 01/18/18 06/09/20 History


 


Sertraline HCl [Zoloft] 100 mg PO QAM 01/18/18 06/09/20 History


 


Simvastatin [Zocor] 20 mg PO HS 01/18/18 06/09/20 History


 


Tamsulosin HCl [Flomax] 0.4 mg PO HS 01/18/18 06/09/20 History


 


Zolpidem Tartrate [Ambien] 5 mg PO HS 01/18/18 06/09/20 History


 


levETIRAcetam [Keppra] 500 mg PO BID 01/18/18 06/09/20 History


 


Furosemide 1 tab PO DAILY 03/01/18 06/09/20 History


 


Mometasone/Formoterol 200/5 2 puff INH BID 10/16/18 06/09/20 History





[Dulera 200 mcg/5 mcg Inhaler]    














- History


PMHx:


 


PSHx: 





FHx:


 


Social:


 








- Vital signs


BP: []  HR: [] RR: [] Tmax: [] Pox: []% on []  Wt: []   








FMR H&P: Results





- Labs


Result Diagrams: 


 06/08/20 23:11





 06/08/20 23:18


Lab results: 


 











WBC  7.3 thou/uL (4.8-10.8)   06/08/20  23:11    


 


Hgb  13.5 g/dL (14.0-18.0)  L  06/08/20  23:11    


 


Hct  39.1 % (42.0-52.0)  L  06/08/20  23:11    


 


MCV  95.6 fL (78.0-98.0)   06/08/20  23:11    


 


Plt Count  414 thou/uL (130-400)  H  06/08/20  23:11    


 


Neutrophils %  80.9 % (42.0-75.0)  H  06/08/20  23:11    


 


Sodium  139 mmol/L (136-145)   06/08/20  23:18    


 


Potassium  3.2 mmol/L (3.5-5.1)  L  06/08/20  23:18    


 


Chloride  102 mmol/L ()   06/08/20  23:18    


 


Carbon Dioxide  25 mmol/L (23-31)   06/08/20  23:18    


 


BUN  11 mg/dL (8.4-25.7)   06/08/20  23:18    


 


Creatinine  0.73 mg/dL (0.7-1.3)   06/08/20  23:18    


 


Glucose  102 mg/dL ()   06/08/20  23:18    


 


Lactic Acid  2.4 mmol/L (0.5-2.2)  H  06/08/20  23:11    


 


Calcium  8.8 mg/dL (7.8-10.44)   06/08/20  23:18    


 


Total Bilirubin  0.4 mg/dL (0.2-1.2)   06/08/20  23:18    


 


AST  15 U/L (5-34)   06/08/20  23:18    


 


ALT  9 U/L (8-55)   06/08/20  23:18    


 


Alkaline Phosphatase  74 U/L ()   06/08/20  23:18    


 


Serum Total Protein  6.9 g/dL (5.8-8.1)   06/08/20  23:18    


 


Albumin  3.4 g/dL (3.4-4.8)   06/08/20  23:18    


 


Urine Ketones  Negative mg/dL (Negative)   06/08/20  23:11    


 


Urine Blood  Negative  (Negative)   06/08/20  23:11    


 


Urine Nitrite  Negative  (Negative)   06/08/20  23:11    


 


Ur Leukocyte Esterase  75 Meet/uL (Negative)  A  06/08/20  23:11    


 


Urine RBC  4-6 HPF (0-3)  A  06/08/20  23:11    


 


Urine WBC  11-20 HPF (0-3)  A  06/08/20  23:11    


 


Ur Squamous Epith Cells  None Seen HPF (0-3)   06/08/20  23:11    


 


Urine Bacteria  1+ HPF (None Seen)  A  06/08/20  23:11    














FMR H&P: Upper Level





- Plan


Date/Time: 06/09/20 0208








PCP: [facility]





HPI:





This is a 78 yo M being admitted for treatment of sepsis 2/2 UTI. Fever and 

chills started earlier today, feels like last time I had UTI. States he does 

have burning with urination but denies hematuria. He denies difficulty with 

urination. Has been tolerating PO. No cough, sob, or known COVID exposures. No N

/V/D. He denies abdominal pain. He does have back pain but this is chronic. He 

lives at home with his wife.





PMH: DM2, COPD, Lung CA s/p lobectomy, HTN, Seizure disorder (last seizure >10 

years ago), HTN


PSH: prostate cancer (says he was told was going to get radiation, not started 

yet, follows with onc and uro at [facility]), L lung lobectomy


Meds: see above


Allergies: ibuprofen, penicillin (not sure of reaction, thinks upset stomach)


Soc Hx: quit smoking 20 years ago, 20 pack-year history, 2-3 drinks per day 

bourbon, denies illicit drugs


Fm hx: non-contributory





ED course:


Vancomycin 1.5g, 2g Rocephin, Tylenol, 2.5L NS, Tylenol 1g





REVIEW OF SYSTEMS: 


Gen: see hpi


Neuro: denies headache


Eyes: no visual changes


ENT: no hearing changes, no sore throat, no congestion


Resp: denies cough, SOB, or decreased exercise tolerance


Card: denies chest pain or palpitations


GI: no N/V/D, no abdominal pain


Skin: no rash, no erythema





Vitals:  /74  P118   T101.3  R23    95% on RA   80 kg





PHYSICAL EXAMINATION: 


General: NAD, alert and oriented x3


HEENT: PERRLA, EOMI, normal sclera, oropharynx without erythema or exudate


Neck: Supple. Full ROM.


Heart/Cardiovascular System: RRR, Cap refill < 3 seconds, no rub, no murmur


Lungs/Respiratory System:  CTA-B, no resp distress


Abdomen/Gastro-Intestinal System: no abdominal tenderness, normal bowel sounds, 

no CVA tenderness, no tenderness on prostate exam


Extremities: Warm extremities. No cyanosis or edema


Neuro: No gross deficits appreciated. CN 2-12 grossly intact


Psychiatry: Awake, Alert and cooperative with exam


Skin: No lesions, rashes, or ulcers


Musculoskeletal: Full ROM





A/P:





# Sepsis 2/2 UTI


- Similar presentation following prostate biopsy in January, E. coli at that 

time


- Tmax 102.8, P 127, WBC 7.3, LA 2.4


- s/p rocephin, vancomycin in ED, continue rocephin


- chronic bacterial prostatitis considered but less likely at this time


-  Ucx, Bcx pending





# Prostate Cancer


- Biopsy Jan 2020, Follows at [facility] with uro and onc, pending radiation per 

patient





# Lung Cancer s/p lobectomy


- in remission per patient





# Seizure disorder


- home keppra





# HTN


- home meds





# COPD


- home inhaler





# DM2


- diet controlled per patient, check A1c








Fluids: LR 125ml/hr


Code: DNAR


PPx: lovenox


Dispo: inpatient pending Ucx





Addendum - Attending





- Attending Attestation


Date/Time: 06/09/20 0925





I personally evaluated the patient and discussed the management with Dr. ANGELITA Phillips. 


I agree with the History, Examination, Assessment and Plan documented above 

with any addition or exceptions noted below.





Patient here with fever, chills, dysuria, prostate cancer, and UA suggestive of 

UTI. He meets SIRS criteria upon arrival to ED, so has been diagnosed with 

Sepsis 2/2 UTI. S/P Rocephin and Vanc. He will be admitted, continue Rocephin. 

Cultures pending. IVF resuscitation. COVID pending but low suspicion. Prostate 

exam was apparently unrevealing by Dr. Phillips. Will continue to treat but may 

need prolonged course of abx. Continue home meds for chronic conditions.

## 2020-06-10 LAB
ANION GAP SERPL CALC-SCNC: 20 MMOL/L (ref 10–20)
BASOPHILS # BLD AUTO: 0 THOU/UL (ref 0–0.2)
BASOPHILS NFR BLD AUTO: 0.4 % (ref 0–1)
BUN SERPL-MCNC: 7 MG/DL (ref 8.4–25.7)
CALCIUM SERPL-MCNC: 9.2 MG/DL (ref 7.8–10.44)
CHLORIDE SERPL-SCNC: 102 MMOL/L (ref 98–107)
CO2 SERPL-SCNC: 17 MMOL/L (ref 23–31)
CREAT CL PREDICTED SERPL C-G-VRATE: 105 ML/MIN (ref 70–130)
EOSINOPHIL # BLD AUTO: 0 THOU/UL (ref 0–0.7)
EOSINOPHIL NFR BLD AUTO: 0.3 % (ref 0–10)
GLUCOSE SERPL-MCNC: 87 MG/DL (ref 83–110)
HGB BLD-MCNC: 14.6 G/DL (ref 14–18)
LYMPHOCYTES # BLD: 1.3 THOU/UL (ref 1.2–3.4)
LYMPHOCYTES NFR BLD AUTO: 12.4 % (ref 21–51)
MCH RBC QN AUTO: 32.8 PG (ref 27–31)
MCV RBC AUTO: 98.8 FL (ref 78–98)
MONOCYTES # BLD AUTO: 0.9 THOU/UL (ref 0.11–0.59)
MONOCYTES NFR BLD AUTO: 8.3 % (ref 0–10)
NEUTROPHILS # BLD AUTO: 8.3 THOU/UL (ref 1.4–6.5)
NEUTROPHILS NFR BLD AUTO: 78.6 % (ref 42–75)
PLATELET # BLD AUTO: 422 THOU/UL (ref 130–400)
POTASSIUM SERPL-SCNC: 4.3 MMOL/L (ref 3.5–5.1)
RBC # BLD AUTO: 4.46 MILL/UL (ref 4.7–6.1)
SODIUM SERPL-SCNC: 135 MMOL/L (ref 136–145)
WBC # BLD AUTO: 10.5 THOU/UL (ref 4.8–10.8)

## 2020-06-10 RX ADMIN — MOMETASONE FUROATE AND FORMOTEROL FUMARATE DIHYDRATE SCH PUFF: 200; 5 AEROSOL RESPIRATORY (INHALATION) at 07:05

## 2020-06-10 RX ADMIN — NIFEDIPINE SCH MG: 60 TABLET, EXTENDED RELEASE ORAL at 20:13

## 2020-06-10 RX ADMIN — Medication SCH: at 09:05

## 2020-06-10 RX ADMIN — CEFTRIAXONE SCH MLS: 2 INJECTION, POWDER, FOR SOLUTION INTRAMUSCULAR; INTRAVENOUS at 23:02

## 2020-06-10 RX ADMIN — Medication SCH: at 20:16

## 2020-06-10 RX ADMIN — MOMETASONE FUROATE AND FORMOTEROL FUMARATE DIHYDRATE SCH PUFF: 200; 5 AEROSOL RESPIRATORY (INHALATION) at 17:42

## 2020-06-10 RX ADMIN — LACTOBACILLUS ACIDOPHILUS / LACTOBACILLUS BULGARICUS SCH GM: 100 MILLION CFU STRENGTH GRANULES at 10:04

## 2020-06-10 NOTE — PDOC.FM
- Subjective


Subjective: 





Patient reports 8 episodes of brown diarrhea overnight that started yesterday. 

Denies that he has a history of DM2. Reports he did not sleep well due to the 

diarrhea. No other complaints this AM. 





- Objective


Vital Signs & Weight: 


 Vital Signs (12 hours)











  Temp Pulse Resp BP BP Pulse Ox


 


 06/09/20 23:40       97


 


 06/09/20 22:45  97.8 F  93  18   165/75 H  97


 


 06/09/20 22:15   89    150/86 H 


 


 06/09/20 22:04   94    


 


 06/09/20 21:39   94  18   196/95 H 


 


 06/09/20 20:00   100   211/106 H  


 


 06/09/20 19:56  99.0 F  100  16   211/102 H  95








 Weight











Weight                         82.962 kg














I&O: 


 











 06/08/20 06/09/20 06/10/20





 06:59 06:59 06:59


 


Intake Total  495 960


 


Output Total  575 400


 


Balance  -80 560











Result Diagrams: 


 06/10/20 06:08





 06/10/20 06:08





Phys Exam





- Physical Examination


Constitutional: NAD


Neck: no nodes, supple


mild expiratory wheezing Rt lower lobe


Irregularly irregular rhythm no murmur


Gastrointestinal: soft, no distention


Musculoskeletal: no edema, pulses present


Neurological: non-focal, moves all 4 limbs


Skin: normal turgor, cap refill <2 seconds





Dx/Plan


(1) Diarrhea


Code(s): R19.7 - DIARRHEA, UNSPECIFIED   Status: Acute   





(2) Sepsis


Code(s): A41.9 - SEPSIS, UNSPECIFIED ORGANISM   Status: Acute   





(3) UTI (urinary tract infection)


Status: Acute   





(4) HLD (hyperlipidemia)


Code(s): E78.5 - HYPERLIPIDEMIA, UNSPECIFIED   Status: Chronic   





(5) HTN (hypertension)


Code(s): I10 - ESSENTIAL (PRIMARY) HYPERTENSION   Status: Chronic   


Qualifiers: 


 





(6) Hx of cancer of lung


Code(s): Z85.118 - PERSONAL HISTORY OF MALIGNANT NEOPLASM OF BRONCHUS AND LUNG 

  Status: Chronic   





- Plan


Plan: 





# Sepsis 2/2 UTI


- Similar presentation following prostate biopsy in January, E. coli at that 

time


- Tmax 102.8, P 127, WBC 7.3, LA 2.4


- s/p rocephin, vancomycin in ED, continue rocephin


- chronic bacterial prostatitis considered but less likely at this time; normal 

prostate exam


-  Ucx young culture, Bcx pending





#Irregularly irregular rhythm


-EKG today





#Diarrhea


-likely the cause of his decrease in CO2 today and anion gap of 16


-Will check for C dif


-Start probiotic





# Prostate Cancer


- Biopsy Jan 2020, Follows at [facility] with uro and onc, pending radiation per 

patient





# Lung Cancer s/p lobectomy


- in remission per patient





# Seizure disorder


- home keppra





# HTN


- home meds





# COPD


- home inhaler


- PRN albuterol





# DM2, not present per patient


- Patient denies this diagnosis today, A1C wnl








Fluids:  ml/hr


Code: DNAR


PPx: lovenox


Dispo: inpatient pending Ucx











Addendum - Attending





- Attending Attestation


Date/Time: 06/10/20 1221





I personally evaluated the patient and discussed the management with Dr. HARPAL Phillips. 


I agree with the History, Examination, Assessment and Plan documented above 

with any addition or exceptions noted below.





Patient feeling improved. Continue treatment for Sepsis 2/2 UTI. Cultures 

pending. Continue Rocephin and await sensitivities.

## 2020-06-10 NOTE — EKG
Test Reason : 

Blood Pressure : ***/*** mmHG

Vent. Rate : 092 BPM     Atrial Rate : 092 BPM

   P-R Int : 168 ms          QRS Dur : 098 ms

    QT Int : 432 ms       P-R-T Axes : 028 023 022 degrees

   QTc Int : 534 ms

 

Normal sinus rhythm

Prolonged QT

Abnormal ECG

Confirmed by CANDIE RODRIGES ([id number]) on 6/10/2020 3:51:16 PM

 

Referred By:  TY  *r           Confirmed By:CANDIE RODRIGES

## 2020-06-11 VITALS — TEMPERATURE: 98.2 F | DIASTOLIC BLOOD PRESSURE: 82 MMHG | SYSTOLIC BLOOD PRESSURE: 167 MMHG

## 2020-06-11 RX ADMIN — Medication SCH: at 08:00

## 2020-06-11 RX ADMIN — LACTOBACILLUS ACIDOPHILUS / LACTOBACILLUS BULGARICUS SCH GM: 100 MILLION CFU STRENGTH GRANULES at 07:58

## 2020-06-11 RX ADMIN — MOMETASONE FUROATE AND FORMOTEROL FUMARATE DIHYDRATE SCH PUFF: 200; 5 AEROSOL RESPIRATORY (INHALATION) at 08:27

## 2020-06-11 NOTE — PDOC.FM
- Subjective


Subjective: 








Patient reports feeling much better today than he did on admission. He reports 

his diarrhea resolved, he had a more formed stool last night. No complaints 

this morning.











- Objective


Vital Signs & Weight: 


 Vital Signs (12 hours)











  Temp Pulse Resp BP BP Pulse Ox


 


 06/11/20 03:58  97.5 F L  97  18   160/98 H  96


 


 06/11/20 00:00      163/67 H 


 


 06/10/20 20:13   86   177/99 H  


 


 06/10/20 20:00  97.8 F  86  18   177/99 H  96








 Weight











Weight                         82.962 kg














I&O: 


 











 06/10/20 06/11/20 06/12/20





 06:59 06:59 06:59


 


Intake Total 960  


 


Output Total 400  


 


Balance 560  











Result Diagrams: 


 06/10/20 06:08





 06/10/20 06:08





Phys Exam





- Physical Examination


Constitutional: NAD


Respiratory: no wheezing, clear to auscultation bilateral


Cardiovascular: RRR, no significant murmur


Gastrointestinal: soft, non-tender, positive bowel sounds


Trace edema BLE


Neurological: non-focal, moves all 4 limbs


Psychiatric: normal affect, A&O x 3


Skin: no rash, normal turgor, cap refill <2 seconds





Dx/Plan


(1) Diarrhea


Code(s): R19.7 - DIARRHEA, UNSPECIFIED   Status: Acute   





(2) Sepsis


Code(s): A41.9 - SEPSIS, UNSPECIFIED ORGANISM   Status: Acute   





(3) UTI (urinary tract infection)


Status: Acute   





(4) HLD (hyperlipidemia)


Code(s): E78.5 - HYPERLIPIDEMIA, UNSPECIFIED   Status: Chronic   





(5) HTN (hypertension)


Code(s): I10 - ESSENTIAL (PRIMARY) HYPERTENSION   Status: Chronic   


Qualifiers: 


 





(6) Hx of cancer of lung


Code(s): Z85.118 - PERSONAL HISTORY OF MALIGNANT NEOPLASM OF BRONCHUS AND LUNG 

  Status: Chronic   





- Plan


Plan: 





# Sepsis 2/2 E coli UTI


- s/p rocephin, vancomycin in ED


- continued on rocephin


- chronic bacterial prostatitis considered but less likely at this time; normal 

prostate exam


- Sensitivities show resistance to cipro and levaquin


- Plan to DC to home today with bactrim, total treatment 7 days





#Irregularly irregular rhythm, r/o


-EKG regular rhythm QT slightly prolonged





#Diarrhea


-C dif pending


-Start probiotic





# Prostate Cancer


- Biopsy Jan 2020, Follows at [facility] with uro and onc, pending radiation per 

patient





# Lung Cancer s/p lobectomy


- in remission per patient





# Seizure disorder


- home keppra





# HTN


- home meds





# COPD


- home inhaler


- PRN albuterol





# DM2, not present per patient


- Patient denies this diagnosis today, A1C wnl





Fluids:  ml/hr


Code: DNAR


PPx: lovenox


Dispo: inpatient pending Ucx








Addendum - Attending





- Attending Attestation


Date/Time: 06/11/20 6977





I personally evaluated the patient and discussed the management with Dr. Phillips. 


I agree with the History, Examination, Assessment and Plan documented above 

with any addition or exceptions noted below.





Patient doing well. Stable for discharge on PO abx to complete course of 

therapy for E.coli UTI.

## 2020-06-11 NOTE — DIS
DATE OF ADMISSION:  06/09/2020



DATE OF DISCHARGE:  06/11/2020



RESIDENT:  Sandra Phillips MD.



ATTENDING:  Gordon Rodriguez MD



DISCHARGE ATTENDING:  Cristofer Tillman MD



CONSULTS:  None.



PROCEDURES:  Chest x-ray on 06/08/2020, impression, no radiographic evidence of

acute cardiopulmonary process.  EKG on 06/10/2020, QTc prolonged at 534

milliseconds.  Normal sinus rhythm. 



PRIMARY DIAGNOSIS:  Sepsis secondary to Escherichia coli urinary tract 
infection.



SECONDARY DIAGNOSES:  

1. QT prolongation. 

2. Diarrhea.

3. History of prostate cancer.

4. History of lung cancer, status post lobectomy.

5. History of seizure disorder.

6. Hypertension.

7. Chronic obstructive pulmonary disease.



DISCHARGE MEDICATIONS:  

1. Bactrim Double Strength one tablet p.o. b.i.d. for 5 days.

2. Lisinopril 40 mg p.o. daily.

3. Furosemide 20 mg p.o. daily.

4. Keppra 500 mg p.o. b.i.d.

5. Dulera 2 puffs inhalation b.i.d.

6. Nifedipine 50 mg p.o. at bedtime.

7. Sertraline 100 mg p.o. q.a.m.

8. Simvastatin 20 mg p.o. at bedtime.

9. Tamsulosin 0.4 mg p.o. at bedtime.

10. Ambien 5 mg p.o. at bedtime.

11. Celecoxib 200 mg p.o. q.a.m.



DISCONTINUED MEDICATIONS:  Lisinopril 20 mg p.o. daily.



HISTORY OF PRESENT ILLNESS/HOSPITAL COURSE:  79-year-old male admitted for 
treatment

of sepsis secondary to UTI.  Reported fever and chills and stated he feels 
similarly to last time he had a UTI.

Reported burning dysuria.  Denied hematuria.  The patient had a recent prostate

biopsy in January, hx of Escherichia coli UTI.  In ED, his temperature was 102.8
,

pulse is 127.  White blood cell count was normal.  Lactic acid was 2.4.  The 
patient

was given Rocephin and vancomycin and continued on Rocephin.  Prostate exam was

normal.  Blood and urine cultures were drawn, urine cultures grew out 
Escherichia

coli.  The patient was sent home on Bactrim.  Blood cultures showed no growth to

date.  Stool stample was tested for Clostridium difficile due to patient having 
diarrhea since

first night of admission that was negative.  The patient's blood pressures were

elevated; his lisinopril was increased to 40 mg p.o. daily upon discharge. 



DISPOSITION:  Stable.



DISCHARGE INSTRUCTIONS:  

1. Location:  Home.

2. Diet:  Heart healthy.

3. Activity:  As tolerated.

4. Follow up with PCP at their [facility] within 1 week.







Job ID:  [id number]



[facility]

## 2021-01-23 ENCOUNTER — HOSPITAL ENCOUNTER (EMERGENCY)
Dept: HOSPITAL 92 - ERS | Age: 81
Discharge: HOME | End: 2021-01-23
Payer: MEDICARE

## 2021-01-23 DIAGNOSIS — Z86.73: ICD-10-CM

## 2021-01-23 DIAGNOSIS — U07.1: Primary | ICD-10-CM

## 2021-01-23 DIAGNOSIS — Z79.899: ICD-10-CM

## 2021-01-23 DIAGNOSIS — N39.0: ICD-10-CM

## 2021-01-23 DIAGNOSIS — J44.9: ICD-10-CM

## 2021-01-23 LAB
ALBUMIN SERPL BCG-MCNC: 3.6 G/DL (ref 3.4–4.8)
ALP SERPL-CCNC: 86 U/L (ref 40–110)
ALT SERPL W P-5'-P-CCNC: 12 U/L (ref 8–55)
ANION GAP SERPL CALC-SCNC: 19 MMOL/L (ref 10–20)
APTT PPP: 30.7 SEC (ref 22.9–36.1)
APTT PPP: 31 SEC (ref 22.9–36.1)
AST SERPL-CCNC: 17 U/L (ref 5–34)
BACTERIA UR QL AUTO: (no result) HPF
BASOPHILS # BLD AUTO: 0.1 THOU/UL (ref 0–0.2)
BASOPHILS NFR BLD AUTO: 0.8 % (ref 0–1)
BILIRUB SERPL-MCNC: 0.6 MG/DL (ref 0.2–1.2)
BUN SERPL-MCNC: 11 MG/DL (ref 8.4–25.7)
CALCIUM SERPL-MCNC: 8.9 MG/DL (ref 7.8–10.44)
CHLORIDE SERPL-SCNC: 104 MMOL/L (ref 98–107)
CO2 SERPL-SCNC: 20 MMOL/L (ref 23–31)
CREAT CL PREDICTED SERPL C-G-VRATE: 0 ML/MIN (ref 70–130)
EOSINOPHIL # BLD AUTO: 0.1 THOU/UL (ref 0–0.7)
EOSINOPHIL NFR BLD AUTO: 1.3 % (ref 0–10)
GLOBULIN SER CALC-MCNC: 3.6 G/DL (ref 2.4–3.5)
GLUCOSE SERPL-MCNC: 168 MG/DL (ref 83–110)
HGB BLD-MCNC: 12.4 G/DL (ref 14–18)
INR PPP: 1.1
INR PPP: 1.1
LEUKOCYTE ESTERASE UR QL STRIP.AUTO: 25 LEU/UL
LYMPHOCYTES # BLD: 0.5 THOU/UL (ref 1.2–3.4)
LYMPHOCYTES NFR BLD AUTO: 5.6 % (ref 21–51)
MCH RBC QN AUTO: 34.5 PG (ref 27–31)
MCV RBC AUTO: 97 FL (ref 78–98)
MONOCYTES # BLD AUTO: 0.4 THOU/UL (ref 0.11–0.59)
MONOCYTES NFR BLD AUTO: 4.2 % (ref 0–10)
NEUTROPHILS # BLD AUTO: 7.6 THOU/UL (ref 1.4–6.5)
NEUTROPHILS NFR BLD AUTO: 88 % (ref 42–75)
PLATELET # BLD AUTO: 240 THOU/UL (ref 130–400)
POTASSIUM SERPL-SCNC: 3.5 MMOL/L (ref 3.5–5.1)
PROTHROMBIN TIME: 14.2 SEC (ref 12–14.7)
PROTHROMBIN TIME: 14.5 SEC (ref 12–14.7)
RBC # BLD AUTO: 3.59 MILL/UL (ref 4.7–6.1)
RBC UR QL AUTO: (no result) HPF (ref 0–3)
SARS-COV-2 RNA RESP QL NAA+PROBE: DETECTED
SODIUM SERPL-SCNC: 139 MMOL/L (ref 136–145)
SP GR UR STRIP: 1.01 (ref 1–1.04)
WBC # BLD AUTO: 8.6 THOU/UL (ref 4.8–10.8)
WBC UR QL AUTO: (no result) HPF (ref 0–3)

## 2021-01-23 PROCEDURE — 71045 X-RAY EXAM CHEST 1 VIEW: CPT

## 2021-01-23 PROCEDURE — 80053 COMPREHEN METABOLIC PANEL: CPT

## 2021-01-23 PROCEDURE — 81015 MICROSCOPIC EXAM OF URINE: CPT

## 2021-01-23 PROCEDURE — 94760 N-INVAS EAR/PLS OXIMETRY 1: CPT

## 2021-01-23 PROCEDURE — 87086 URINE CULTURE/COLONY COUNT: CPT

## 2021-01-23 PROCEDURE — 87186 SC STD MICRODIL/AGAR DIL: CPT

## 2021-01-23 PROCEDURE — 83605 ASSAY OF LACTIC ACID: CPT

## 2021-01-23 PROCEDURE — 84484 ASSAY OF TROPONIN QUANT: CPT

## 2021-01-23 PROCEDURE — 36415 COLL VENOUS BLD VENIPUNCTURE: CPT

## 2021-01-23 PROCEDURE — 85025 COMPLETE CBC W/AUTO DIFF WBC: CPT

## 2021-01-23 PROCEDURE — 84443 ASSAY THYROID STIM HORMONE: CPT

## 2021-01-23 PROCEDURE — 0240U: CPT

## 2021-01-23 PROCEDURE — 87040 BLOOD CULTURE FOR BACTERIA: CPT

## 2021-01-23 PROCEDURE — 85610 PROTHROMBIN TIME: CPT

## 2021-01-23 PROCEDURE — 85730 THROMBOPLASTIN TIME PARTIAL: CPT

## 2021-01-23 PROCEDURE — 87077 CULTURE AEROBIC IDENTIFY: CPT

## 2021-01-23 PROCEDURE — 93005 ELECTROCARDIOGRAM TRACING: CPT

## 2021-01-23 PROCEDURE — 96365 THER/PROPH/DIAG IV INF INIT: CPT

## 2021-01-23 PROCEDURE — 81003 URINALYSIS AUTO W/O SCOPE: CPT

## 2021-01-23 PROCEDURE — 96375 TX/PRO/DX INJ NEW DRUG ADDON: CPT

## 2021-01-23 NOTE — RAD
Portable frontal chest radiograph:

1/23/2021



COMPARISON: 6/8/2020



HISTORY: Shortness of breath, Covid positive patient, fever, history of urinary tract infection



FINDINGS: There is atherosclerotic calcification of the aortic arch. There is an azygos lobe and fiss
ure are noted. Nonspecific mild increased linear interstitial density noted in the perihilar

regions and both lung bases. No focal consolidation or alveolar edema. Linear density in the right ba
se is more conspicuous than on the prior exam.



IMPRESSION: Nonspecific interstitial density in the perihilar regions and lung bases, especially on t
he right. No focal consolidation or alveolar edema.



Reported By: Cristofer Mcelroy 

Electronically Signed:  1/23/2021 3:45 PM

## 2022-08-18 ENCOUNTER — HOSPITAL ENCOUNTER (EMERGENCY)
Dept: HOSPITAL 92 - CSHERS | Age: 82
Discharge: HOME | End: 2022-08-18
Payer: MEDICARE

## 2022-08-18 DIAGNOSIS — Z86.73: ICD-10-CM

## 2022-08-18 DIAGNOSIS — E86.0: Primary | ICD-10-CM

## 2022-08-18 LAB
ALBUMIN SERPL BCG-MCNC: 3.8 G/DL (ref 3.4–4.8)
ALP SERPL-CCNC: 71 U/L (ref 40–110)
ALT SERPL W P-5'-P-CCNC: 18 U/L (ref 8–55)
ANION GAP SERPL CALC-SCNC: 14 MMOL/L (ref 10–20)
AST SERPL-CCNC: 16 U/L (ref 5–34)
BASOPHILS # BLD AUTO: 0.1 10X3/UL (ref 0–0.2)
BASOPHILS NFR BLD AUTO: 1.2 % (ref 0–2)
BILIRUB SERPL-MCNC: 0.8 MG/DL (ref 0.2–1.2)
BUN SERPL-MCNC: 55 MG/DL (ref 8.4–25.7)
CALCIUM SERPL-MCNC: 9.2 MG/DL (ref 7.8–10.44)
CHLORIDE SERPL-SCNC: 102 MMOL/L (ref 98–107)
CO2 SERPL-SCNC: 23 MMOL/L (ref 23–31)
CREAT CL PREDICTED SERPL C-G-VRATE: 0 ML/MIN (ref 70–130)
EOSINOPHIL # BLD AUTO: 0.3 10X3/UL (ref 0–0.5)
EOSINOPHIL NFR BLD AUTO: 4.6 % (ref 0–6)
GLOBULIN SER CALC-MCNC: 2.9 G/DL (ref 2.4–3.5)
GLUCOSE SERPL-MCNC: 103 MG/DL (ref 83–110)
HGB BLD-MCNC: 12.9 G/DL (ref 13.5–17.5)
LYMPHOCYTES NFR BLD AUTO: 21.6 % (ref 18–47)
MCH RBC QN AUTO: 35.8 PG (ref 27–33)
MCV RBC AUTO: 99.2 FL (ref 81.2–95.1)
MONOCYTES # BLD AUTO: 0.6 10X3/UL (ref 0–1.1)
MONOCYTES NFR BLD AUTO: 10.6 % (ref 0–10)
NEUTROPHILS # BLD AUTO: 3.5 10X3/UL (ref 1.5–8.4)
NEUTROPHILS NFR BLD AUTO: 61.5 % (ref 40–75)
PLATELET # BLD AUTO: 229 10X3/UL (ref 150–450)
POTASSIUM SERPL-SCNC: 4.6 MMOL/L (ref 3.5–5.1)
RBC # BLD AUTO: 3.6 10X6/UL (ref 4.32–5.72)
SODIUM SERPL-SCNC: 134 MMOL/L (ref 136–145)
WBC # BLD AUTO: 5.7 10X3/UL (ref 3.5–10.5)

## 2022-08-18 PROCEDURE — 70450 CT HEAD/BRAIN W/O DYE: CPT

## 2022-08-18 PROCEDURE — 80053 COMPREHEN METABOLIC PANEL: CPT

## 2022-08-18 PROCEDURE — 96360 HYDRATION IV INFUSION INIT: CPT

## 2022-08-18 PROCEDURE — 85025 COMPLETE CBC W/AUTO DIFF WBC: CPT

## 2022-08-18 PROCEDURE — 71045 X-RAY EXAM CHEST 1 VIEW: CPT

## 2022-08-18 PROCEDURE — 36415 COLL VENOUS BLD VENIPUNCTURE: CPT

## 2022-11-22 ENCOUNTER — HOSPITAL ENCOUNTER (INPATIENT)
Dept: HOSPITAL 92 - CSHERS | Age: 82
DRG: 176 | End: 2022-11-22
Attending: FAMILY MEDICINE | Admitting: FAMILY MEDICINE
Payer: MEDICARE

## 2022-11-22 VITALS — BODY MASS INDEX: 24.2 KG/M2

## 2022-11-22 VITALS — TEMPERATURE: 96.6 F

## 2022-11-22 VITALS — SYSTOLIC BLOOD PRESSURE: 79 MMHG | DIASTOLIC BLOOD PRESSURE: 58 MMHG

## 2022-11-22 DIAGNOSIS — I26.99: Primary | ICD-10-CM

## 2022-11-22 DIAGNOSIS — Z85.46: ICD-10-CM

## 2022-11-22 DIAGNOSIS — Z88.0: ICD-10-CM

## 2022-11-22 DIAGNOSIS — Z86.73: ICD-10-CM

## 2022-11-22 DIAGNOSIS — Z20.822: ICD-10-CM

## 2022-11-22 DIAGNOSIS — G40.909: ICD-10-CM

## 2022-11-22 DIAGNOSIS — E11.9: ICD-10-CM

## 2022-11-22 DIAGNOSIS — N17.9: ICD-10-CM

## 2022-11-22 DIAGNOSIS — J44.9: ICD-10-CM

## 2022-11-22 DIAGNOSIS — Z85.118: ICD-10-CM

## 2022-11-22 DIAGNOSIS — E86.0: ICD-10-CM

## 2022-11-22 DIAGNOSIS — Z66: ICD-10-CM

## 2022-11-22 DIAGNOSIS — I10: ICD-10-CM

## 2022-11-22 DIAGNOSIS — Z79.899: ICD-10-CM

## 2022-11-22 DIAGNOSIS — Z88.8: ICD-10-CM

## 2022-11-22 DIAGNOSIS — Z87.891: ICD-10-CM

## 2022-11-22 LAB
ALBUMIN SERPL BCG-MCNC: 3.2 G/DL (ref 3.4–4.8)
ALP SERPL-CCNC: 90 U/L (ref 40–110)
ALT SERPL W P-5'-P-CCNC: 8 U/L (ref 8–55)
ANION GAP SERPL CALC-SCNC: 16 MMOL/L (ref 10–20)
ANION GAP SERPL CALC-SCNC: 19 MMOL/L (ref 10–20)
APTT PPP: 28.6 SEC (ref 22–33)
AST SERPL-CCNC: 16 U/L (ref 5–34)
BACTERIA UR QL AUTO: (no result) HPF
BASOPHILS # BLD AUTO: 0 10X3/UL (ref 0–0.2)
BASOPHILS # BLD AUTO: 0 10X3/UL (ref 0–0.2)
BASOPHILS NFR BLD AUTO: 0.4 % (ref 0–2)
BASOPHILS NFR BLD AUTO: 0.5 % (ref 0–2)
BILIRUB SERPL-MCNC: 1.4 MG/DL (ref 0.2–1.2)
BUN SERPL-MCNC: 36 MG/DL (ref 8.4–25.7)
BUN SERPL-MCNC: 40 MG/DL (ref 8.4–25.7)
CALCIUM SERPL-MCNC: 8.6 MG/DL (ref 7.8–10.44)
CALCIUM SERPL-MCNC: 8.7 MG/DL (ref 7.8–10.44)
CHLORIDE SERPL-SCNC: 111 MMOL/L (ref 98–107)
CHLORIDE SERPL-SCNC: 112 MMOL/L (ref 98–107)
CK MB SERPL-MCNC: 2 NG/ML (ref 0–6.6)
CK MB SERPL-MCNC: 5.3 NG/ML (ref 0–6.6)
CK MB SERPL-MCNC: 6 NG/ML (ref 0–6.6)
CO2 SERPL-SCNC: 14 MMOL/L (ref 23–31)
CO2 SERPL-SCNC: 19 MMOL/L (ref 23–31)
CREAT CL PREDICTED SERPL C-G-VRATE: 0 ML/MIN (ref 70–130)
CREAT CL PREDICTED SERPL C-G-VRATE: 51 ML/MIN (ref 70–130)
EOSINOPHIL # BLD AUTO: 0.1 10X3/UL (ref 0–0.5)
EOSINOPHIL # BLD AUTO: 0.3 10X3/UL (ref 0–0.5)
EOSINOPHIL NFR BLD AUTO: 0.8 % (ref 0–6)
EOSINOPHIL NFR BLD AUTO: 4 % (ref 0–6)
GLOBULIN SER CALC-MCNC: 2.6 G/DL (ref 2.4–3.5)
GLUCOSE SERPL-MCNC: 130 MG/DL (ref 83–110)
GLUCOSE SERPL-MCNC: 150 MG/DL (ref 83–110)
HGB BLD-MCNC: 14.8 G/DL (ref 13.5–17.5)
HGB BLD-MCNC: 15.7 G/DL (ref 13.5–17.5)
INR PPP: 1.1
LAVENDER: (no result)
LIPASE SERPL-CCNC: 37 U/L (ref 8–78)
LYMPHOCYTES NFR BLD AUTO: 14.9 % (ref 18–47)
LYMPHOCYTES NFR BLD AUTO: 9.3 % (ref 18–47)
MACROCYTES BLD QL SMEAR: (no result) (100X)
MAGNESIUM SERPL-MCNC: 2 MG/DL (ref 1.6–2.6)
MAGNESIUM SERPL-MCNC: 2.1 MG/DL (ref 1.6–2.6)
MCH RBC QN AUTO: 35.9 PG (ref 27–33)
MCH RBC QN AUTO: 36.3 PG (ref 27–33)
MCV RBC AUTO: 104.9 FL (ref 81.2–95.1)
MCV RBC AUTO: 107.1 FL (ref 81.2–95.1)
MONOCYTES # BLD AUTO: 0.6 10X3/UL (ref 0–1.1)
MONOCYTES # BLD AUTO: 0.9 10X3/UL (ref 0–1.1)
MONOCYTES NFR BLD AUTO: 11.1 % (ref 0–10)
MONOCYTES NFR BLD AUTO: 7.1 % (ref 0–10)
NEUTROPHILS # BLD AUTO: 6.1 10X3/UL (ref 1.5–8.4)
NEUTROPHILS # BLD AUTO: 6.1 10X3/UL (ref 1.5–8.4)
NEUTROPHILS NFR BLD AUTO: 72.6 % (ref 40–75)
NEUTROPHILS NFR BLD AUTO: 78.6 % (ref 40–75)
PLATELET # BLD AUTO: 157 10X3/UL (ref 150–450)
PLATELET # BLD AUTO: 173 10X3/UL (ref 150–450)
POTASSIUM SERPL-SCNC: 3.7 MMOL/L (ref 3.5–5.1)
POTASSIUM SERPL-SCNC: 4.6 MMOL/L (ref 3.5–5.1)
PROTHROMBIN TIME: 11.9 SEC (ref 9.5–12.1)
RBC # BLD AUTO: 4.08 10X6/UL (ref 4.32–5.72)
RBC # BLD AUTO: 4.37 10X6/UL (ref 4.32–5.72)
RED: (no result)
SODIUM SERPL-SCNC: 139 MMOL/L (ref 136–145)
SODIUM SERPL-SCNC: 143 MMOL/L (ref 136–145)
SP GR UR STRIP: 1.01 (ref 1–1.03)
WBC # BLD AUTO: 7.7 10X3/UL (ref 3.5–10.5)
WBC # BLD AUTO: 8.4 10X3/UL (ref 3.5–10.5)
WBC UR QL AUTO: (no result) HPF (ref 0–3)

## 2022-11-22 PROCEDURE — 93010 ELECTROCARDIOGRAM REPORT: CPT

## 2022-11-22 PROCEDURE — 83735 ASSAY OF MAGNESIUM: CPT

## 2022-11-22 PROCEDURE — 90715 TDAP VACCINE 7 YRS/> IM: CPT

## 2022-11-22 PROCEDURE — 85730 THROMBOPLASTIN TIME PARTIAL: CPT

## 2022-11-22 PROCEDURE — 87086 URINE CULTURE/COLONY COUNT: CPT

## 2022-11-22 PROCEDURE — 70450 CT HEAD/BRAIN W/O DYE: CPT

## 2022-11-22 PROCEDURE — 71045 X-RAY EXAM CHEST 1 VIEW: CPT

## 2022-11-22 PROCEDURE — B548ZZA ULTRASONOGRAPHY OF SUPERIOR VENA CAVA, GUIDANCE: ICD-10-PCS | Performed by: FAMILY MEDICINE

## 2022-11-22 PROCEDURE — 81015 MICROSCOPIC EXAM OF URINE: CPT

## 2022-11-22 PROCEDURE — U0002 COVID-19 LAB TEST NON-CDC: HCPCS

## 2022-11-22 PROCEDURE — 72125 CT NECK SPINE W/O DYE: CPT

## 2022-11-22 PROCEDURE — 96368 THER/DIAG CONCURRENT INF: CPT

## 2022-11-22 PROCEDURE — 96365 THER/PROPH/DIAG IV INF INIT: CPT

## 2022-11-22 PROCEDURE — 74177 CT ABD & PELVIS W/CONTRAST: CPT

## 2022-11-22 PROCEDURE — 80053 COMPREHEN METABOLIC PANEL: CPT

## 2022-11-22 PROCEDURE — 71275 CT ANGIOGRAPHY CHEST: CPT

## 2022-11-22 PROCEDURE — 94660 CPAP INITIATION&MGMT: CPT

## 2022-11-22 PROCEDURE — 36416 COLLJ CAPILLARY BLOOD SPEC: CPT

## 2022-11-22 PROCEDURE — 81003 URINALYSIS AUTO W/O SCOPE: CPT

## 2022-11-22 PROCEDURE — 85025 COMPLETE CBC W/AUTO DIFF WBC: CPT

## 2022-11-22 PROCEDURE — 93005 ELECTROCARDIOGRAM TRACING: CPT

## 2022-11-22 PROCEDURE — 84484 ASSAY OF TROPONIN QUANT: CPT

## 2022-11-22 PROCEDURE — 83690 ASSAY OF LIPASE: CPT

## 2022-11-22 PROCEDURE — 94760 N-INVAS EAR/PLS OXIMETRY 1: CPT

## 2022-11-22 PROCEDURE — 90471 IMMUNIZATION ADMIN: CPT

## 2022-11-22 PROCEDURE — 85610 PROTHROMBIN TIME: CPT

## 2022-11-22 PROCEDURE — 36415 COLL VENOUS BLD VENIPUNCTURE: CPT

## 2022-11-22 PROCEDURE — 96366 THER/PROPH/DIAG IV INF ADDON: CPT

## 2022-11-22 PROCEDURE — 83605 ASSAY OF LACTIC ACID: CPT

## 2022-11-22 PROCEDURE — 3E033XZ INTRODUCTION OF VASOPRESSOR INTO PERIPHERAL VEIN, PERCUTANEOUS APPROACH: ICD-10-PCS | Performed by: FAMILY MEDICINE

## 2022-11-22 PROCEDURE — 96367 TX/PROPH/DG ADDL SEQ IV INF: CPT

## 2022-11-22 PROCEDURE — 02HV33Z INSERTION OF INFUSION DEVICE INTO SUPERIOR VENA CAVA, PERCUTANEOUS APPROACH: ICD-10-PCS | Performed by: FAMILY MEDICINE

## 2022-11-22 PROCEDURE — 82553 CREATINE MB FRACTION: CPT

## 2022-11-22 PROCEDURE — 87040 BLOOD CULTURE FOR BACTERIA: CPT

## 2022-11-22 RX ADMIN — EPINEPHRINE ONE MG: 0.1 INJECTION INTRACARDIAC; INTRAVENOUS at 23:32

## 2022-11-23 RX ADMIN — EPINEPHRINE ONE MG: 0.1 INJECTION INTRACARDIAC; INTRAVENOUS at 01:17

## 2022-11-23 RX ADMIN — Medication ONE: at 01:21

## 2022-11-23 RX ADMIN — Medication ONE MLS: at 01:16
